# Patient Record
Sex: MALE | Race: WHITE | Employment: OTHER | URBAN - METROPOLITAN AREA
[De-identification: names, ages, dates, MRNs, and addresses within clinical notes are randomized per-mention and may not be internally consistent; named-entity substitution may affect disease eponyms.]

---

## 2019-04-30 ENCOUNTER — HOSPITAL ENCOUNTER (EMERGENCY)
Age: 69
Discharge: HOME | End: 2019-04-30
Attending: EMERGENCY MEDICINE
Payer: MEDICARE

## 2019-04-30 VITALS
TEMPERATURE: 98.3 F | HEART RATE: 82 BPM | DIASTOLIC BLOOD PRESSURE: 81 MMHG | RESPIRATION RATE: 18 BRPM | SYSTOLIC BLOOD PRESSURE: 153 MMHG | WEIGHT: 220 LBS | OXYGEN SATURATION: 96 %

## 2019-04-30 DIAGNOSIS — R31.9 HEMATURIA, UNSPECIFIED TYPE: ICD-10-CM

## 2019-04-30 DIAGNOSIS — R33.8 ACUTE URINARY RETENTION: Primary | ICD-10-CM

## 2019-04-30 LAB
ANION GAP SERPL CALCULATED.3IONS-SCNC: 12 MMOL/L (ref 3–16)
BILIRUBIN URINE: NEGATIVE
BLOOD, URINE: ABNORMAL
BUN BLDV-MCNC: 17 MG/DL (ref 7–20)
CALCIUM SERPL-MCNC: 9.4 MG/DL (ref 8.3–10.6)
CHLORIDE BLD-SCNC: 107 MMOL/L (ref 99–110)
CLARITY: CLEAR
CO2: 22 MMOL/L (ref 21–32)
COLOR: YELLOW
CREAT SERPL-MCNC: 0.9 MG/DL (ref 0.8–1.3)
EPITHELIAL CELLS, UA: ABNORMAL /HPF
GFR AFRICAN AMERICAN: >60
GFR NON-AFRICAN AMERICAN: >60
GLUCOSE BLD-MCNC: 120 MG/DL (ref 70–99)
GLUCOSE URINE: NEGATIVE MG/DL
HCT VFR BLD CALC: 43.3 % (ref 40.5–52.5)
HEMOGLOBIN: 14.6 G/DL (ref 13.5–17.5)
KETONES, URINE: NEGATIVE MG/DL
LEUKOCYTE ESTERASE, URINE: NEGATIVE
MCH RBC QN AUTO: 33.2 PG (ref 26–34)
MCHC RBC AUTO-ENTMCNC: 33.8 G/DL (ref 31–36)
MCV RBC AUTO: 98.1 FL (ref 80–100)
MICROSCOPIC EXAMINATION: YES
NITRITE, URINE: NEGATIVE
PDW BLD-RTO: 14.1 % (ref 12.4–15.4)
PH UA: 5.5 (ref 5–8)
PLATELET # BLD: 268 K/UL (ref 135–450)
PMV BLD AUTO: 6.9 FL (ref 5–10.5)
POTASSIUM SERPL-SCNC: 4.1 MMOL/L (ref 3.5–5.1)
PROTEIN UA: NEGATIVE MG/DL
RBC # BLD: 4.41 M/UL (ref 4.2–5.9)
RBC UA: ABNORMAL /HPF (ref 0–2)
SODIUM BLD-SCNC: 141 MMOL/L (ref 136–145)
SPECIFIC GRAVITY UA: <=1.005 (ref 1–1.03)
URINE REFLEX TO CULTURE: ABNORMAL
URINE TYPE: ABNORMAL
UROBILINOGEN, URINE: 0.2 E.U./DL
WBC # BLD: 10.1 K/UL (ref 4–11)
WBC UA: ABNORMAL /HPF (ref 0–5)

## 2019-04-30 PROCEDURE — 51702 INSERT TEMP BLADDER CATH: CPT

## 2019-04-30 PROCEDURE — 99284 EMERGENCY DEPT VISIT MOD MDM: CPT

## 2019-04-30 PROCEDURE — 80048 BASIC METABOLIC PNL TOTAL CA: CPT

## 2019-04-30 PROCEDURE — 51798 US URINE CAPACITY MEASURE: CPT

## 2019-04-30 PROCEDURE — 36415 COLL VENOUS BLD VENIPUNCTURE: CPT

## 2019-04-30 PROCEDURE — 81001 URINALYSIS AUTO W/SCOPE: CPT

## 2019-04-30 PROCEDURE — 85027 COMPLETE CBC AUTOMATED: CPT

## 2019-04-30 RX ORDER — TAMSULOSIN HYDROCHLORIDE 0.4 MG/1
0.4 CAPSULE ORAL DAILY
Status: ON HOLD | COMMUNITY
Start: 2018-07-16 | End: 2022-04-13 | Stop reason: HOSPADM

## 2019-04-30 SDOH — HEALTH STABILITY: MENTAL HEALTH: HOW OFTEN DO YOU HAVE A DRINK CONTAINING ALCOHOL?: NEVER

## 2019-04-30 ASSESSMENT — ENCOUNTER SYMPTOMS
DIARRHEA: 0
SHORTNESS OF BREATH: 0
NAUSEA: 1
COLOR CHANGE: 0
ABDOMINAL PAIN: 1
BACK PAIN: 1
VOMITING: 0

## 2019-04-30 ASSESSMENT — PAIN SCALES - GENERAL: PAINLEVEL_OUTOF10: 9

## 2019-04-30 NOTE — ED NOTES
Drained 1600 mL of urine from arreaga bag, then switched pt to leg bag. He acknowledged my care taking instructions.      Cinthia Heart  04/30/19 7564

## 2019-04-30 NOTE — ED PROVIDER NOTES
[facility]  ED  eMERGENCY dEPARTMENT eNCOUnter        Pt Name: Toni Islas  MRN: [id number]  Joycelyngfenrique 1950  Date of evaluation: 4/30/2019  Provider: Houston Manrique MD  PCP: Abbey Schneider MD      CHIEF COMPLAINT       Chief Complaint   Patient presents with    Urinary Retention     started in the middle of the night       HISTORY OFPRESENT ILLNESS   (Location/Symptom, Timing/Onset, Context/Setting, Quality, Duration, Modifying Factors,Severity)  Note limiting factors. Toni Islas is a 80 y.o. male presenting today due to concern for inability to urinate since the middle of the night with associated lower abdominal pain and nausea that radiated to his back. He had greater than 1 L of urine retention upon arrival to the emergency department by bladder scan and therefore a Marte catheter was placed. Currently, he states his abdominal discomfort and back pain is gone and he is no longer nauseated. No vomiting or fever. No chest pain or shortness of breath. He has a history of benign prostatic hyperplasia and follows up with  urology. He is supposed to have an MRI of his prostate in the near future but states he just got back from [place] and needed to set up the appointment with his urologist.  Denies any other concerns at this time and again he is already feeling better once the Marte catheter was placed. He has never needed a Marte in the past.  No midline back pain, falls, or trauma. REVIEW OF SYSTEMS    (2-9 systems for level 4, 10 or more for level 5)     Review of Systems   Constitutional: Negative for chills and fever. HENT: Negative for congestion. Respiratory: Negative for shortness of breath. Cardiovascular: Negative for chest pain. Gastrointestinal: Positive for abdominal pain (resolved) and nausea (resolved). Negative for diarrhea and vomiting.    Genitourinary: Positive for decreased urine volume, difficulty urinating and flank pain (resolved). Negative for dysuria and hematuria. Musculoskeletal: Positive for back pain (resolved). Negative for neck pain. Skin: Negative for color change. Neurological: Negative for weakness and numbness. Psychiatric/Behavioral: Negative for confusion. Positives and Pertinent negatives as per HPI. PASTMEDICAL HISTORY     Past Medical History:   Diagnosis Date    BPH (benign prostatic hyperplasia)          SURGICAL HISTORY       Past Surgical History:   Procedure Laterality Date    HERNIA REPAIR           CURRENT MEDICATIONS       Discharge Medication List as of 4/30/2019  8:37 AM      CONTINUE these medications which have NOT CHANGED    Details   tamsulosin (FLOMAX) 0.4 MG capsule Take 0.4 mg by mouthHistorical Med             ALLERGIES     Pcn [penicillins]    FAMILY HISTORY     History reviewed. No pertinent family history.        SOCIAL HISTORY       Social History     Socioeconomic History    Marital status:      Spouse name: None    Number of children: None    Years of education: None    Highest education level: None   Occupational History    None   Social Needs    Financial resource strain: None    Food insecurity:     Worry: None     Inability: None    Transportation needs:     Medical: None     Non-medical: None   Tobacco Use    Smoking status: Never Smoker    Smokeless tobacco: Never Used   Substance and Sexual Activity    Alcohol use: Never     Frequency: Never    Drug use: Yes     Types: Marijuana    Sexual activity: None   Lifestyle    Physical activity:     Days per week: None     Minutes per session: None    Stress: None   Relationships    Social connections:     Talks on phone: None     Gets together: None     Attends Christian service: None     Active member of club or organization: None     Attends meetings of clubs or organizations: None     Relationship status: None    Intimate partner violence:     Fear of current or ex partner: None Emotionally abused: None     Physically abused: None     Forced sexual activity: None   Other Topics Concern    None   Social History Narrative    None       SCREENINGS      @FLOW([id number])@      PHYSICAL EXAM    (up to 7 for level 4, 8 or more for level 5)     ED Triage Vitals [04/30/19 0723]   BP Temp Temp Source Pulse Resp SpO2 Height Weight   (!) 175/99 98.3 °F (36.8 °C) Oral 107 16 99 % -- 220 lb (99.8 kg)       Physical Exam   Constitutional: He is oriented to person, place, and time. He appears well-developed and well-nourished. He is active and cooperative. Non-toxic appearance. He does not have a sickly appearance. He does not appear ill. No distress. HENT:   Head: Normocephalic and atraumatic. Neck: Neck supple. Cardiovascular: Normal rate, regular rhythm and intact distal pulses. Pulmonary/Chest: Effort normal and breath sounds normal. No stridor. No respiratory distress. He has no wheezes. He has no rales. Abdominal: Soft. Bowel sounds are normal. He exhibits no distension. There is no tenderness. There is no rigidity, no rebound, no guarding, no CVA tenderness, no tenderness at McBurney's point and negative Groves's sign. Musculoskeletal: He exhibits no deformity. Thoracic back: He exhibits normal range of motion, no tenderness and no bony tenderness. Lumbar back: He exhibits normal range of motion, no tenderness and no bony tenderness. Neurological: He is alert and oriented to person, place, and time. Skin: Skin is warm and dry. He is not diaphoretic. Psychiatric: He has a normal mood and affect. Nursing note and vitals reviewed.           DIAGNOSTIC RESULTS   LABS:    Labs Reviewed   URINE RT REFLEX TO CULTURE - Abnormal; Notable for the following components:       Result Value    Blood, Urine LARGE (*)     All other components within normal limits    Narrative:     Performed at:  [facility]) - [address], [address] [place] [address] Phone [phone] - Abnormal; Notable for the following components:    Glucose 120 (*)     All other components within normal limits    Narrative:     Performed at:  [facility]) [address],  [place], [address] [facility]   Phone [phone]   MICROSCOPIC URINALYSIS - Abnormal; Notable for the following components:    RBC, UA 10-20 (*)     All other components within normal limits    Narrative:     Performed at:  [address],  [place], [address] [facility]   Phone [phone]   CBC    Narrative:     Performed at:  [address],  [place], [address] [facility]   Phone [phone]       All other labs were within normal range or not returned asof this dictation. EKG: All EKG's are interpreted by the Emergency Department Physician who either signs or Co-signs this chart in the absence of a cardiologist.        RADIOLOGY:   Non-plain film images such as CT, Ultrasound and MRI are read by the radiologist. Seth Shaikh images are visualized and preliminarily interpreted by the  ED Provider with the belowfindings:        Interpretation per the Radiologist below, if available at the time of this note:    No orders to display         PROCEDURES   Unless otherwise noted below, none     Procedures    CRITICAL CARE TIME   N/A    CONSULTS:  None    EMERGENCY DEPARTMENT COURSE and DIFFERENTIAL DIAGNOSIS/MDM:   Vitals:    Vitals:    04/30/19 0723 04/30/19 0817 04/30/19 0910   BP: (!) 175/99 136/73 (!) 153/81   Pulse: 107  82   Resp: 16  18   Temp: 98.3 °F (36.8 °C)     TempSrc: Oral     SpO2: 99% 93% 96%   Weight: 220 lb (99.8 kg)         Patient was given the following medications:  Medications - No data to display    Patient was evaluated due to concern for urinary retention with lower abdominal pain. Currently, he is asymptomatic once the Marte was placed by nursing staff.   No significant findings on lab work other than hematuria which could've been due to Marte and he is already planning to follow-up with urology related to this. He knows to return to the emergency department for difficulty with urinating with Marte in place, development of abdominal pain with vomiting or fever, other concerning symptoms, but otherwise follow up with urology in the next 2-3 days for further evaluation with bladder obstruction most likely from enlarged prostate. He was well-appearing and in no acute distress at time of discharge and felt comfortable with this plan. The patient tolerated their visit well. The patient and / or the family were informed of the results of any tests, a time was given to answer questions. FINAL IMPRESSION      1. Acute urinary retention    2.  Hematuria, unspecified type          DISPOSITION/PLAN   DISPOSITION  - discharged in improved, stable condition      PATIENT REFERRED TO:  [facility]  ED  [address]  Go to   If symptoms worsen    Beatriz Tong, [address]  [phone]      As needed    MD [facility] [address] [facility]  [phone]    Call today        Lorraine Sosa:  Discharge Medication List as of 4/30/2019  8:37 AM          DISCONTINUED MEDICATIONS:  Discharge Medication List as of 4/30/2019  8:37 AM                 (Please note that portions of this note were completed with a voicerecognition program.  Efforts were made to edit the dictations but occasionally words are mis-transcribed.)    Tadeo Petty MD (electronically signed)            Tadeo Petty MD  04/30/19 6256

## 2022-04-10 ENCOUNTER — APPOINTMENT (OUTPATIENT)
Dept: GENERAL RADIOLOGY | Age: 72
DRG: 246 | End: 2022-04-10
Payer: MEDICARE

## 2022-04-10 ENCOUNTER — HOSPITAL ENCOUNTER (INPATIENT)
Age: 72
LOS: 3 days | Discharge: HOME | DRG: 246 | End: 2022-04-13
Attending: EMERGENCY MEDICINE | Admitting: INTERNAL MEDICINE
Payer: MEDICARE

## 2022-04-10 DIAGNOSIS — I21.09 ACUTE ANTERIOR MYOCARDIAL INFARCTION (HCC): ICD-10-CM

## 2022-04-10 DIAGNOSIS — I48.91 ATRIAL FIBRILLATION, UNSPECIFIED TYPE (HCC): ICD-10-CM

## 2022-04-10 DIAGNOSIS — Z95.5 STENTED CORONARY ARTERY: ICD-10-CM

## 2022-04-10 DIAGNOSIS — I21.02 ACUTE ST ELEVATION MYOCARDIAL INFARCTION (STEMI) DUE TO OCCLUSION OF LEFT ANTERIOR DESCENDING (LAD) CORONARY ARTERY (HCC): ICD-10-CM

## 2022-04-10 DIAGNOSIS — I21.3 ST ELEVATION MYOCARDIAL INFARCTION (STEMI), UNSPECIFIED ARTERY (HCC): Primary | ICD-10-CM

## 2022-04-10 LAB
A/G RATIO: 1.6 (ref 1.1–2.2)
ALBUMIN SERPL-MCNC: 4.7 G/DL (ref 3.4–5)
ALP BLD-CCNC: 61 U/L (ref 40–129)
ALT SERPL-CCNC: 27 U/L (ref 10–40)
ANION GAP SERPL CALCULATED.3IONS-SCNC: 14 MMOL/L (ref 3–16)
APTT: 31.4 SEC (ref 26.2–38.6)
APTT: 32 SEC (ref 26.2–38.6)
APTT: 33.8 SEC (ref 26.2–38.6)
AST SERPL-CCNC: 68 U/L (ref 15–37)
BASOPHILS ABSOLUTE: 0 K/UL (ref 0–0.2)
BASOPHILS RELATIVE PERCENT: 0.7 %
BILIRUB SERPL-MCNC: 0.4 MG/DL (ref 0–1)
BUN BLDV-MCNC: 11 MG/DL (ref 7–20)
CALCIUM SERPL-MCNC: 9.4 MG/DL (ref 8.3–10.6)
CHLORIDE BLD-SCNC: 105 MMOL/L (ref 99–110)
CO2: 23 MMOL/L (ref 21–32)
CREAT SERPL-MCNC: 1 MG/DL (ref 0.8–1.3)
EKG ATRIAL RATE: 65 BPM
EKG DIAGNOSIS: NORMAL
EKG P AXIS: 31 DEGREES
EKG P-R INTERVAL: 190 MS
EKG Q-T INTERVAL: 406 MS
EKG QRS DURATION: 108 MS
EKG QTC CALCULATION (BAZETT): 422 MS
EKG R AXIS: -55 DEGREES
EKG T AXIS: 26 DEGREES
EKG VENTRICULAR RATE: 65 BPM
EOSINOPHILS ABSOLUTE: 0 K/UL (ref 0–0.6)
EOSINOPHILS RELATIVE PERCENT: 0.4 %
GFR AFRICAN AMERICAN: >60
GFR NON-AFRICAN AMERICAN: >60
GLUCOSE BLD-MCNC: 130 MG/DL (ref 70–99)
HCT VFR BLD CALC: 48.8 % (ref 40.5–52.5)
HEMOGLOBIN: 16.1 G/DL (ref 13.5–17.5)
INR BLD: 0.96 (ref 0.88–1.12)
LYMPHOCYTES ABSOLUTE: 0.8 K/UL (ref 1–5.1)
LYMPHOCYTES RELATIVE PERCENT: 11 %
MAGNESIUM: 2 MG/DL (ref 1.8–2.4)
MCH RBC QN AUTO: 33.4 PG (ref 26–34)
MCHC RBC AUTO-ENTMCNC: 33.1 G/DL (ref 31–36)
MCV RBC AUTO: 101 FL (ref 80–100)
MONOCYTES ABSOLUTE: 0.5 K/UL (ref 0–1.3)
MONOCYTES RELATIVE PERCENT: 6.3 %
NEUTROPHILS ABSOLUTE: 6.2 K/UL (ref 1.7–7.7)
NEUTROPHILS RELATIVE PERCENT: 81.6 %
PDW BLD-RTO: 14.2 % (ref 12.4–15.4)
PLATELET # BLD: 274 K/UL (ref 135–450)
PMV BLD AUTO: 7.1 FL (ref 5–10.5)
POTASSIUM SERPL-SCNC: 4.3 MMOL/L (ref 3.5–5.1)
PRO-BNP: 238 PG/ML (ref 0–124)
PROTHROMBIN TIME: 10.8 SEC (ref 9.9–12.7)
RBC # BLD: 4.83 M/UL (ref 4.2–5.9)
SODIUM BLD-SCNC: 142 MMOL/L (ref 136–145)
SPECIMEN STATUS: NORMAL
TOTAL PROTEIN: 7.7 G/DL (ref 6.4–8.2)
TROPONIN: 0.37 NG/ML
WBC # BLD: 7.6 K/UL (ref 4–11)

## 2022-04-10 PROCEDURE — 92941 PRQ TRLML REVSC TOT OCCL AMI: CPT | Performed by: INTERNAL MEDICINE

## 2022-04-10 PROCEDURE — 6360000002 HC RX W HCPCS

## 2022-04-10 PROCEDURE — 93454 CORONARY ARTERY ANGIO S&I: CPT | Performed by: INTERNAL MEDICINE

## 2022-04-10 PROCEDURE — B2111ZZ FLUOROSCOPY OF MULTIPLE CORONARY ARTERIES USING LOW OSMOLAR CONTRAST: ICD-10-PCS | Performed by: INTERNAL MEDICINE

## 2022-04-10 PROCEDURE — 2700000000 HC OXYGEN THERAPY PER DAY

## 2022-04-10 PROCEDURE — 93458 L HRT ARTERY/VENTRICLE ANGIO: CPT

## 2022-04-10 PROCEDURE — 2500000003 HC RX 250 WO HCPCS: Performed by: INTERNAL MEDICINE

## 2022-04-10 PROCEDURE — C1887 CATHETER, GUIDING: HCPCS

## 2022-04-10 PROCEDURE — 6360000004 HC RX CONTRAST MEDICATION

## 2022-04-10 PROCEDURE — 85025 COMPLETE CBC W/AUTO DIFF WBC: CPT

## 2022-04-10 PROCEDURE — 6370000000 HC RX 637 (ALT 250 FOR IP): Performed by: INTERNAL MEDICINE

## 2022-04-10 PROCEDURE — 027135Z DILATION OF CORONARY ARTERY, TWO ARTERIES WITH TWO DRUG-ELUTING INTRALUMINAL DEVICES, PERCUTANEOUS APPROACH: ICD-10-PCS | Performed by: INTERNAL MEDICINE

## 2022-04-10 PROCEDURE — C1769 GUIDE WIRE: HCPCS

## 2022-04-10 PROCEDURE — C1760 CLOSURE DEV, VASC: HCPCS

## 2022-04-10 PROCEDURE — 84484 ASSAY OF TROPONIN QUANT: CPT

## 2022-04-10 PROCEDURE — C1725 CATH, TRANSLUMIN NON-LASER: HCPCS

## 2022-04-10 PROCEDURE — 93010 ELECTROCARDIOGRAM REPORT: CPT | Performed by: INTERNAL MEDICINE

## 2022-04-10 PROCEDURE — 83880 ASSAY OF NATRIURETIC PEPTIDE: CPT

## 2022-04-10 PROCEDURE — 85610 PROTHROMBIN TIME: CPT

## 2022-04-10 PROCEDURE — 4A023N7 MEASUREMENT OF CARDIAC SAMPLING AND PRESSURE, LEFT HEART, PERCUTANEOUS APPROACH: ICD-10-PCS | Performed by: INTERNAL MEDICINE

## 2022-04-10 PROCEDURE — C9601 PERC DRUG-EL COR STENT BRAN: HCPCS

## 2022-04-10 PROCEDURE — 71045 X-RAY EXAM CHEST 1 VIEW: CPT

## 2022-04-10 PROCEDURE — C9606 PERC D-E COR REVASC W AMI S: HCPCS

## 2022-04-10 PROCEDURE — 83735 ASSAY OF MAGNESIUM: CPT

## 2022-04-10 PROCEDURE — 99284 EMERGENCY DEPT VISIT MOD MDM: CPT

## 2022-04-10 PROCEDURE — 2709999900 HC NON-CHARGEABLE SUPPLY

## 2022-04-10 PROCEDURE — C1874 STENT, COATED/COV W/DEL SYS: HCPCS

## 2022-04-10 PROCEDURE — 80061 LIPID PANEL: CPT

## 2022-04-10 PROCEDURE — 2580000003 HC RX 258: Performed by: INTERNAL MEDICINE

## 2022-04-10 PROCEDURE — 2000000000 HC ICU R&B

## 2022-04-10 PROCEDURE — 94761 N-INVAS EAR/PLS OXIMETRY MLT: CPT

## 2022-04-10 PROCEDURE — C1894 INTRO/SHEATH, NON-LASER: HCPCS

## 2022-04-10 PROCEDURE — 2720000010 HC SURG SUPPLY STERILE

## 2022-04-10 PROCEDURE — 85730 THROMBOPLASTIN TIME PARTIAL: CPT

## 2022-04-10 PROCEDURE — 36415 COLL VENOUS BLD VENIPUNCTURE: CPT

## 2022-04-10 PROCEDURE — 6370000000 HC RX 637 (ALT 250 FOR IP)

## 2022-04-10 PROCEDURE — 80053 COMPREHEN METABOLIC PANEL: CPT

## 2022-04-10 PROCEDURE — 6360000002 HC RX W HCPCS: Performed by: INTERNAL MEDICINE

## 2022-04-10 PROCEDURE — 99152 MOD SED SAME PHYS/QHP 5/>YRS: CPT | Performed by: INTERNAL MEDICINE

## 2022-04-10 PROCEDURE — 93005 ELECTROCARDIOGRAM TRACING: CPT | Performed by: EMERGENCY MEDICINE

## 2022-04-10 PROCEDURE — 2500000003 HC RX 250 WO HCPCS

## 2022-04-10 PROCEDURE — B2151ZZ FLUOROSCOPY OF LEFT HEART USING LOW OSMOLAR CONTRAST: ICD-10-PCS | Performed by: INTERNAL MEDICINE

## 2022-04-10 RX ORDER — HEPARIN SODIUM 1000 [USP'U]/ML
60 INJECTION, SOLUTION INTRAVENOUS; SUBCUTANEOUS PRN
Status: DISCONTINUED | OUTPATIENT
Start: 2022-04-10 | End: 2022-04-11

## 2022-04-10 RX ORDER — MORPHINE SULFATE 2 MG/ML
2 INJECTION, SOLUTION INTRAMUSCULAR; INTRAVENOUS
Status: ACTIVE | OUTPATIENT
Start: 2022-04-10 | End: 2022-04-10

## 2022-04-10 RX ORDER — METOPROLOL SUCCINATE 25 MG/1
25 TABLET, EXTENDED RELEASE ORAL DAILY
Status: DISCONTINUED | OUTPATIENT
Start: 2022-04-10 | End: 2022-04-12

## 2022-04-10 RX ORDER — HEPARIN SODIUM 1000 [USP'U]/ML
30 INJECTION, SOLUTION INTRAVENOUS; SUBCUTANEOUS PRN
Status: DISCONTINUED | OUTPATIENT
Start: 2022-04-10 | End: 2022-04-11

## 2022-04-10 RX ORDER — EPTIFIBATIDE 0.75 MG/ML
2 INJECTION, SOLUTION INTRAVENOUS CONTINUOUS
Status: ACTIVE | OUTPATIENT
Start: 2022-04-10 | End: 2022-04-10

## 2022-04-10 RX ORDER — HEPARIN SODIUM 10000 [USP'U]/100ML
5-30 INJECTION, SOLUTION INTRAVENOUS CONTINUOUS
Status: DISCONTINUED | OUTPATIENT
Start: 2022-04-10 | End: 2022-04-10

## 2022-04-10 RX ORDER — ATROPINE SULFATE 0.4 MG/ML
0.5 AMPUL (ML) INJECTION
Status: ACTIVE | OUTPATIENT
Start: 2022-04-10 | End: 2022-04-10

## 2022-04-10 RX ORDER — FENTANYL CITRATE 50 UG/ML
INJECTION, SOLUTION INTRAMUSCULAR; INTRAVENOUS
Status: COMPLETED | OUTPATIENT
Start: 2022-04-10 | End: 2022-04-10

## 2022-04-10 RX ORDER — SODIUM CHLORIDE 9 MG/ML
25 INJECTION, SOLUTION INTRAVENOUS PRN
Status: DISCONTINUED | OUTPATIENT
Start: 2022-04-10 | End: 2022-04-13 | Stop reason: HOSPADM

## 2022-04-10 RX ORDER — HEPARIN SODIUM 1000 [USP'U]/ML
INJECTION, SOLUTION INTRAVENOUS; SUBCUTANEOUS
Status: COMPLETED | OUTPATIENT
Start: 2022-04-10 | End: 2022-04-10

## 2022-04-10 RX ORDER — SODIUM CHLORIDE 0.9 % (FLUSH) 0.9 %
5-40 SYRINGE (ML) INJECTION EVERY 12 HOURS SCHEDULED
Status: DISCONTINUED | OUTPATIENT
Start: 2022-04-10 | End: 2022-04-13 | Stop reason: HOSPADM

## 2022-04-10 RX ORDER — HEPARIN SODIUM 1000 [USP'U]/ML
60 INJECTION, SOLUTION INTRAVENOUS; SUBCUTANEOUS ONCE
Status: DISCONTINUED | OUTPATIENT
Start: 2022-04-10 | End: 2022-04-10

## 2022-04-10 RX ORDER — ONDANSETRON 2 MG/ML
4 INJECTION INTRAMUSCULAR; INTRAVENOUS EVERY 6 HOURS PRN
Status: DISCONTINUED | OUTPATIENT
Start: 2022-04-10 | End: 2022-04-13 | Stop reason: HOSPADM

## 2022-04-10 RX ORDER — MIDAZOLAM HYDROCHLORIDE 5 MG/ML
INJECTION INTRAMUSCULAR; INTRAVENOUS
Status: COMPLETED | OUTPATIENT
Start: 2022-04-10 | End: 2022-04-10

## 2022-04-10 RX ORDER — SODIUM CHLORIDE 9 MG/ML
INJECTION, SOLUTION INTRAVENOUS CONTINUOUS
Status: ACTIVE | OUTPATIENT
Start: 2022-04-10 | End: 2022-04-10

## 2022-04-10 RX ORDER — EPTIFIBATIDE 0.75 MG/ML
2 INJECTION, SOLUTION INTRAVENOUS CONTINUOUS
Status: DISCONTINUED | OUTPATIENT
Start: 2022-04-10 | End: 2022-04-10 | Stop reason: SDUPTHER

## 2022-04-10 RX ORDER — ALPRAZOLAM 0.25 MG/1
0.25 TABLET ORAL 2 TIMES DAILY PRN
Status: DISCONTINUED | OUTPATIENT
Start: 2022-04-10 | End: 2022-04-13 | Stop reason: HOSPADM

## 2022-04-10 RX ORDER — MORPHINE SULFATE 2 MG/ML
2 INJECTION, SOLUTION INTRAMUSCULAR; INTRAVENOUS
Status: DISCONTINUED | OUTPATIENT
Start: 2022-04-10 | End: 2022-04-13 | Stop reason: HOSPADM

## 2022-04-10 RX ORDER — NITROGLYCERIN 20 MG/100ML
30 INJECTION INTRAVENOUS CONTINUOUS
Status: DISCONTINUED | OUTPATIENT
Start: 2022-04-10 | End: 2022-04-11

## 2022-04-10 RX ORDER — NITROGLYCERIN 20 MG/100ML
40 INJECTION INTRAVENOUS CONTINUOUS
Status: DISCONTINUED | OUTPATIENT
Start: 2022-04-10 | End: 2022-04-10

## 2022-04-10 RX ORDER — ACETAMINOPHEN 325 MG/1
650 TABLET ORAL EVERY 4 HOURS PRN
Status: DISCONTINUED | OUTPATIENT
Start: 2022-04-10 | End: 2022-04-13 | Stop reason: HOSPADM

## 2022-04-10 RX ORDER — ATORVASTATIN CALCIUM 80 MG/1
80 TABLET, FILM COATED ORAL NIGHTLY
Status: DISCONTINUED | OUTPATIENT
Start: 2022-04-10 | End: 2022-04-13 | Stop reason: HOSPADM

## 2022-04-10 RX ORDER — SODIUM CHLORIDE 0.9 % (FLUSH) 0.9 %
5-40 SYRINGE (ML) INJECTION PRN
Status: DISCONTINUED | OUTPATIENT
Start: 2022-04-10 | End: 2022-04-13 | Stop reason: HOSPADM

## 2022-04-10 RX ADMIN — MIDAZOLAM HYDROCHLORIDE 1 MG: 5 INJECTION INTRAMUSCULAR; INTRAVENOUS at 13:00

## 2022-04-10 RX ADMIN — MIDAZOLAM HYDROCHLORIDE 1 MG: 5 INJECTION INTRAMUSCULAR; INTRAVENOUS at 13:09

## 2022-04-10 RX ADMIN — MIDAZOLAM HYDROCHLORIDE 2 MG: 5 INJECTION INTRAMUSCULAR; INTRAVENOUS at 12:20

## 2022-04-10 RX ADMIN — ACETAMINOPHEN 650 MG: 325 TABLET ORAL at 20:12

## 2022-04-10 RX ADMIN — HEPARIN SODIUM 1000 UNITS: 1000 INJECTION, SOLUTION INTRAVENOUS; SUBCUTANEOUS at 13:07

## 2022-04-10 RX ADMIN — HEPARIN SODIUM 7000 UNITS: 1000 INJECTION, SOLUTION INTRAVENOUS; SUBCUTANEOUS at 12:30

## 2022-04-10 RX ADMIN — FENTANYL CITRATE 25 MCG: 50 INJECTION, SOLUTION INTRAMUSCULAR; INTRAVENOUS at 12:37

## 2022-04-10 RX ADMIN — SODIUM CHLORIDE, PRESERVATIVE FREE 10 ML: 5 INJECTION INTRAVENOUS at 19:34

## 2022-04-10 RX ADMIN — NITROGLYCERIN 30 MCG/MIN: 20 INJECTION INTRAVENOUS at 18:36

## 2022-04-10 RX ADMIN — FENTANYL CITRATE 25 MCG: 50 INJECTION, SOLUTION INTRAMUSCULAR; INTRAVENOUS at 13:00

## 2022-04-10 RX ADMIN — EPTIFIBATIDE 2 MCG/KG/MIN: 0.75 INJECTION INTRAVENOUS at 14:24

## 2022-04-10 RX ADMIN — TICAGRELOR 90 MG: 90 TABLET ORAL at 20:13

## 2022-04-10 RX ADMIN — FENTANYL CITRATE 25 MCG: 50 INJECTION, SOLUTION INTRAMUSCULAR; INTRAVENOUS at 12:20

## 2022-04-10 RX ADMIN — SODIUM CHLORIDE: 9 INJECTION, SOLUTION INTRAVENOUS at 15:35

## 2022-04-10 RX ADMIN — MIDAZOLAM HYDROCHLORIDE 1 MG: 5 INJECTION INTRAMUSCULAR; INTRAVENOUS at 12:37

## 2022-04-10 RX ADMIN — ATORVASTATIN CALCIUM 80 MG: 80 TABLET, FILM COATED ORAL at 20:12

## 2022-04-10 RX ADMIN — EPTIFIBATIDE 2 MCG/KG/MIN: 0.75 INJECTION, SOLUTION INTRAVENOUS at 12:33

## 2022-04-10 RX ADMIN — NITROGLYCERIN 40 MCG/MIN: 20 INJECTION INTRAVENOUS at 14:26

## 2022-04-10 ASSESSMENT — PAIN DESCRIPTION - PAIN TYPE: TYPE: ACUTE PAIN

## 2022-04-10 ASSESSMENT — PAIN SCALES - GENERAL
PAINLEVEL_OUTOF10: 5
PAINLEVEL_OUTOF10: 5

## 2022-04-10 ASSESSMENT — PAIN DESCRIPTION - LOCATION: LOCATION: CHEST

## 2022-04-10 NOTE — ED NOTES
[address] transmission of EKG at this time  1123 - activated incoming pt as a Code STEMI  1130 - Dr Paul Balbuena called back to speak with Dr Ju Farooq  [phone] - [place] RN Rox Mitchell notified to call in cath lab  [phone] - EMS call with ETA of 30 mins from [facility] ED  200 - Dr Paul Balbuena called to notify Dr Ju Farooq that pt will be cathed by another cardiologist  [phone] - Dr Milady Holcomb called to speak with Dr Ju Farooq, Dr Milady Holcomb to cath pt  [phone] - Pt arrival into ED  [address] [facility] ED initial EKG captured, cath lab at the bedside as well  1203 - pt to cath lab via [address] [place] [address]  04/10/22 1208

## 2022-04-10 NOTE — H&P
Hospital Medicine History & Physical      PCP: Yash Weber MD    Date of Admission: 4/10/2022    Date of Service: Pt seen/examined on 04/10/22   and Admitted to Inpatient with expected LOS greater than two midnights due to medical therapy. Chief Complaint   Patient presents with    Chest Pain     Started 1030 this morning. Stemi per EMS. Asa 324 mg per EMS, Fentanyl 100 mcg iv, Zofran 4 mg per EMS     History Of Present Illness:    Hunter Ball is a 80 y.o. Male who presented to the [facility] ED  with complaints of severe chest pain. EMS triage showed anterior STEMI and cath lab was activated at [facility]. .  The patient has chest pain that started this am at about 1030 but had a few warning pains over the last few days . No nausea nor SOB nor presyncope. No bleeding or anticipated surgery in next year. Reports being very active normally. Lives with his wife and reports smoking in the past but currently smokes some \"weed every other day\". Denies alcohol or other illicit drug use . His Chest Pain was described as sharp, central in location associated with Tingling in his Arms . He related  his Arm pain to Arthritis initially . ED course : Patient initial EKG showed Anterior wall STEMI and initial Troponin was 0.37 . He was taken to Cath lab and reportedly had PCI with HOLDEN X2 to LAD . Patient admitted to ICU post Cath for further Care and Hospitalist consulted for admission     Upon evaluation by me in ICU , Patient laying flat in bed , had Right Groin approach . Reports improvement in chest pain compared to PTA . Offers no new complaints . The patient was informed of the results of tests, a time was given to answer questions, a plan was proposed and he agreed with plan. Medical reconciliation performed.        Past Medical History:      Diagnosis Date    BPH (benign prostatic hyperplasia)        Past Surgical History:        Procedure Laterality Date    HERNIA REPAIR         Medications Prior to Admission:    Prior to Admission medications    Medication Sig Start Date End Date Taking? Authorizing Provider   tamsulosin (FLOMAX) 0.4 MG capsule Take 0.4 mg by mouth 7/16/18   Historical Provider, MD       Allergies:  Pcn [penicillins]    Social History:    The patient currently lives at home with his wife and is independent of IADLs    TOBACCO:   reports that he has never smoked. He has never used smokeless tobacco.  ETOH:   reports no history of alcohol use. Family History:     Reviewed in detail and negative for DM, CAD, Cancer, CVA. Positive as follows:    No family history on file. REVIEW OF SYSTEMS:   Pertinent positives as noted in the HPI. All other systems reviewed and negative. PHYSICAL EXAM PERFORMED:  /82   Pulse 57   Temp 98.8 °F (37.1 °C) (Oral)   Resp 15   Ht 6' 1\" (1.854 m)   Wt 220 lb (99.8 kg)   SpO2 (!) 89%   BMI 29.03 kg/m²     General appearance:  No apparent distress, appears stated age and cooperative. HEENT:  Normal cephalic, atraumatic without obvious deformity. Pupils equal, round, and reactive to light. Extra ocular muscles intact. Conjunctivae/corneas clear. Neck: Supple, with full range of motion. No jugular venous distention. Trachea midline. Respiratory:  Normal respiratory effort. Clear to auscultation, bilaterally without Rales/Wheezes/Rhonchi. Cardiovascular:  Regular rate and rhythm with normal S1/S2 without murmurs, rubs or gallops. Abdomen: Soft, non-tender, non-distended with normal bowel sounds. Musculoskeletal:  No clubbing, cyanosis or edema bilaterally. Full range of motion without deformity. Skin: Skin color, texture, turgor normal.  No rashes or lesions. Neurologic:  Neurovascularly intact without any focal sensory/motor deficits.  Cranial nerves: II-XII intact, grossly non-focal.  Psychiatric:  Alert and oriented, thought content appropriate, normal insight  Capillary Refill: Brisk,< 3 seconds   Peripheral Pulses: +2 palpable, equal bilaterally       Labs:   Recent Labs     04/10/22  1200   WBC 7.6   HGB 16.1   HCT 48.8        Recent Labs     04/10/22  1200      K 4.3      CO2 23   BUN 11   CREATININE 1.0   CALCIUM 9.4     Recent Labs     04/10/22  1200   AST 68*   ALT 27   BILITOT 0.4   ALKPHOS 61     Recent Labs     04/10/22  1200   INR 0.96     Recent Labs     04/10/22  1200   TROPONINI 0.37*       EKG:  I have reviewed the EKG with the following interpretation: Anterior wall STEMI     Radiology:    I have reviewed the Imaging  with the following interpretation:   XR CHEST PORTABLE   Final Result   Small left basilar pneumonia               Active Hospital Problems    Diagnosis Date Noted    STEMI (ST elevation myocardial infarction) ([facility] [address] [I21.3] 04/10/2022    Acute anterior myocardial infarction [facility]) [I21.09] 04/10/2022    Acute ST elevation myocardial infarction (STEMI) due to occlusion of left anterior descending (LAD) coronary artery (HCC) [I21.02]      ASSESSMENT/PLAN:  1. Chest Pain due to CAD /Anterior wall STEMI POA   - S/p Emergent LHC and PCI with HOLDEN x2 to LAD on 4/10/22 by Dr. Althea Andrade  - Admitted to ICU and follow Post Cath protocol per cardiology   - Continue Brilinta , Integrilin x 12 hrs      2. Hx of BPH S/p prostatectomy - Stable ; Patient reports that he doesn't  Take Flomax anymore . DVT Prophylaxis: Integrilin gtt   Diet: ADULT DIET; Regular; Low Fat/Low Chol/High Fiber/ISMAEL; No Added Salt (3-4 gm)  Code Status: Full Code    PT/OT Eval Status: Not yet ordered     Dispo - Anticipate > 2 MN stay in the hospital      Wilfrido Pantoja MD    The note was completed using Dragon -speech recognition software & EMR  . Every effort was made to ensure accuracy; however, inadvertent computerized transcription errors may be present. Thank you Wiliam Washington MD for the opportunity to be involved in this patient's care.  If you have any questions or concerns please feel free to contact me at [phone].

## 2022-04-10 NOTE — H&P
[address]  CARDIOLOGY ADMISSION NOTE        Reason for Consultation/Chief Complaint: \"stemi. \"       History of Present Illness:  Kendra Bumpers is a 80 y.o. patient who presented to the hospital with complaints of severe chest pain. EMS triage showed anterior STEMI and cath lab was activated at [facility]. .  The patient has chest pain that started this am at about 1030 but had a few warning pains over the last few days. No nausea nor SOB nor presyncope. No bleeding or anticipated surgery in next year. Reports being very active normally. Past Medical History:   has a past medical history of BPH (benign prostatic hyperplasia). Surgical History:   has a past surgical history that includes hernia repair. Social History:   reports that he has never smoked. He has never used smokeless tobacco. He reports current drug use. Drug: Marijuana Armjen Martínez). He reports that he does not drink alcohol. Family History:  No evidence for sudden cardiac death or premature CAD    Home Medications:  Were reviewed and are listed in nursing record. and/or listed below  Prior to Admission medications    Medication Sig Start Date End Date Taking? Authorizing Provider   tamsulosin (FLOMAX) 0.4 MG capsule Take 0.4 mg by mouth 7/16/18   Historical Provider, MD        [facility] Medications:   ticagrelor  180 mg Oral Once    heparin (porcine)  60 Units/kg IntraVENous Once     heparin (porcine), heparin (porcine)   heparin (PORCINE) Infusion      eptifibatide 2 mcg/kg/min (04/10/22 1233)       Allergies:  Pcn [penicillins]     Review of Systems:     A 14 ROS obtained and negative except as mentioned in HPI. · Constitutional: there has been no unanticipated weight loss. · Eyes: No visual changes or diplopia. No scleral icterus. · ENT: No Headaches, hearing loss or vertigo. No mouth sores or sore throat. · Cardiovascular: No loss of consciousness. No hemoptysis, pleuritic pain, or phlebitis.   · Respiratory: No cough or wheezing, no sputum production. No hematemesis. · Gastrointestinal: No abdominal pain, appetite loss, blood in stools. No change in bowel or bladder habits. · Genitourinary: No dysuria, or hematuria. · Musculoskeletal:  No gait disturbance, weakness or joint complaints. · Integumentary: No rash or pruritis. · Neurological: No headache, diplopia,numbness or tingling. No change in gait, balance, coordination, mood, affect, memory, mentation, behavior. · Psychiatric: No anxiety,  · Endocrine: No malaise,  · Hematologic/Lymphatic: No abnormal bruising  · Allergic/Immunologic: No nasal congestion      Physical Examination:    Vitals:    04/10/22 1158   BP: (!) 157/96   Pulse: 66   Resp: 16   Temp: 98.8 °F (37.1 °C)   SpO2: 100%    Weight: 220 lb (99.8 kg)         General Appearance:  Alert, cooperative, no distress, appears stated age   Head:  Normocephalic, without obvious abnormality, atraumatic   Eyes:  PERRL   Nose: Nares normal,   Neck: Supple, JVP normal    Lungs:   Clear to auscultation bilaterally, respirations unlabored   Chest Wall:  No tenderness or deformity   Heart:  Regular rate and rhythm, normal S1, S2 normal, no murmur, I/VI HSM  No rub. No S3 gallop   Abdomen:   Soft, non-tender, +bowel sounds   Extremities: no cyanosis, no clubbing , No  d* edema   Pulses: Symmetric extremities   Skin: no gross lesions or rashes   Pysch: Normal mood and affect   Neurologic: No gross deficits.   CN II - XII grossly intact        Labs  CBC:   Lab Results   Component Value Date    WBC 7.6 04/10/2022    RBC 4.83 04/10/2022    HGB 16.1 04/10/2022    HCT 48.8 04/10/2022    .0 04/10/2022    RDW 14.2 04/10/2022     04/10/2022     CMP:    Lab Results   Component Value Date     04/10/2022    K 4.3 04/10/2022     04/10/2022    CO2 23 04/10/2022    BUN 11 04/10/2022    CREATININE 1.0 04/10/2022    GFRAA >60 04/10/2022    AGRATIO 1.6 04/10/2022    LABGLOM >60 04/10/2022    GLUCOSE 130 04/10/2022 PROT 7.7 04/10/2022    CALCIUM 9.4 04/10/2022    BILITOT 0.4 04/10/2022    ALKPHOS 61 04/10/2022    AST 68 04/10/2022    ALT 27 04/10/2022     PT/INR:  No results found for: PTINR  Recent Labs     04/10/22  1200   TROPONINI 0.37*       EKG: SR  Anterior STEMI      Impression:  Acute anterior STEMI    Recommendations:    I had the opportunity to review the clinical symptoms and presentation of Kendra Bumpers. I recommend that the patient undergo further cardiac evaluation with  cardiac catheterization with likely LAD Coronary intervention *. Needs core measure meds  The patient will require ICU managememnt and is critically ill. Thank you for allowing to us to participate in the care or Kendra Bumpers. The note was completed using EMR. Every effort was made to ensure accuracy; however, inadvertent computerized transcription errors may be present.        Shaunna Richmond MD [facility]

## 2022-04-10 NOTE — PROGRESS NOTES
Spoke with cath lab and Dr. Marlon Sandra regarding pts Nitro gtt. Turn gtt down to 30 mcg/min. Stop gtt if systolic is less than 576. OK to hold metoprolol due to HR 57. Hold heparin gtt.

## 2022-04-10 NOTE — PROGRESS NOTES
Spoke with Dr. Gricelda Falk. Decrease pts Nitro gtt to 30 mcg/min, turn off infusion if sys goes below 100. Keep patient flat for 4 hours.

## 2022-04-10 NOTE — ED PROVIDER NOTES
CHIEF COMPLAINT  Chest Pain (Started 1030 this morning. Stemi per EMS. Asa 324 mg per EMS, Fentanyl 100 mcg iv, Zofran 4 mg per EMS)      HISTORY OF PRESENT ILLNESS  Frank Organ is a 80 y.o. male with a history of no chronic medical conditions who presents to the ED complaining of chest pain. Patient reports onset of chest pain today. States he was completely normal yesterday. EMS EKG concerning with anterolateral ST elevations. Cath Lab was activated prior to patient arrival.  He received aspirin and fentanyl during transport. Pain is left-sided, moderate in intensity. Denies dyspnea, syncope, palpitations, emesis, abdominal pain, fever, chills. Denies any prior cardiac history. No other complaints, modifying factors or associated symptoms. I have reviewed the following from the nursing documentation. Past Medical History:   Diagnosis Date    BPH (benign prostatic hyperplasia)      Past Surgical History:   Procedure Laterality Date    HERNIA REPAIR       No family history on file. Social History     Socioeconomic History    Marital status:      Spouse name: Not on file    Number of children: Not on file    Years of education: Not on file    Highest education level: Not on file   Occupational History    Not on file   Tobacco Use    Smoking status: Never Smoker    Smokeless tobacco: Never Used   Substance and Sexual Activity    Alcohol use: Never    Drug use: Yes     Types: Marijuana Garon Kettle)    Sexual activity: Not on file   Other Topics Concern    Not on file   Social History Narrative    Not on file     Social Determinants of Health     Financial Resource Strain:     Difficulty of Paying Living Expenses: Not on file   Food Insecurity:     Worried About Running Out of Food in the Last Year: Not on file    Lauren of Food in the Last Year: Not on file   Transportation Needs:     Lack of Transportation (Medical): Not on file    Lack of Transportation (Non-Medical):  Not on file   Physical Activity:     Days of Exercise per Week: Not on file    Minutes of Exercise per Session: Not on file   Stress:     Feeling of Stress : Not on file   Social Connections:     Frequency of Communication with Friends and Family: Not on file    Frequency of Social Gatherings with Friends and Family: Not on file    Attends Nondenominational Services: Not on file    Active Member of Clubs or Organizations: Not on file    Attends Club or Organization Meetings: Not on file    Marital Status: Not on file   Intimate Partner Violence:     Fear of Current or Ex-Partner: Not on file    Emotionally Abused: Not on file    Physically Abused: Not on file    Sexually Abused: Not on file   Housing Stability:     Unable to Pay for Housing in the Last Year: Not on file    Number of [place] in the Last Year: Not on file    Unstable Housing in the Last Year: Not on file     Current Facility-Administered Medications   Medication Dose Route Frequency Provider Last Rate Last Admin    ticagrelor (BRILINTA) tablet 180 mg  180 mg Oral Once Jessi Renteria MD        heparin (porcine) injection 60 Units/kg  60 Units/kg IntraVENous Once Jessi Renteria MD        heparin (porcine) injection 60 Units/kg  60 Units/kg IntraVENous PRN Jessi Renteria MD        heparin (porcine) injection 30 Units/kg  30 Units/kg IntraVENous PRN Jessi Renteria MD        heparin 25,000 unit in sodium chloride 0.45% 250 mL (premix) infusion  5-30 Units/kg/hr IntraVENous Continuous Jessi Renteria MD   Held at 04/10/22 1429    0.9 % sodium chloride infusion   IntraVENous Continuous Natasha Alfredo MD        sodium chloride flush 0.9 % injection 5-40 mL  5-40 mL IntraVENous 2 times per day Natasha Alfredo MD        sodium chloride flush 0.9 % injection 5-40 mL  5-40 mL IntraVENous PRN Natasha Alfredo MD        0.9 % sodium chloride infusion  25 mL IntraVENous PRN Natasha Alfredo MD        acetaminophen (TYLENOL) tablet 650 mg  650 mg Oral Q4H PRN Tylene Samples, MD        atropine injection 0.5 mg  0.5 mg IntraVENous Once PRN Tylene Samples, MD        morphine (PF) injection 2 mg  2 mg IntraVENous Once PRN Tylene Samples, MD        ondansetron [facility]) injection 4 mg  4 mg IntraVENous Q6H PRN Tylene Samples, MD        atorvastatin (LIPITOR) tablet 80 mg  80 mg Oral Nightly Tylene Samples, MD        metoprolol succinate (TOPROL XL) extended release tablet 25 mg  25 mg Oral Daily Tylene Samples, MD        ticagrelor [facility]) tablet 90 mg  90 mg Oral BID Tylene Samples, MD        nitroGLYCERIN 50 mg in dextrose 5% 250 mL infusion  40 mcg/min IntraVENous Continuous Tylene Samples, MD 9 mL/hr at 04/10/22 1436 30 mcg/min at 04/10/22 1436    eptifibatide (INTEGRILIN) 0.75 mg/mL infusion  2 mcg/kg/min IntraVENous Continuous Tylene Samples, MD 16 mL/hr at 04/10/22 1424 2 mcg/kg/min at 04/10/22 1424     Allergies   Allergen Reactions    Pcn [Penicillins]        REVIEW OF SYSTEMS  10 systems reviewed, pertinent positives per HPI otherwise noted to be negative. PHYSICAL EXAM  BP (!) 157/96   Pulse 57   Temp 98.8 °F (37.1 °C) (Oral)   Resp 16   Ht 6' 1\" (1.854 m)   Wt 220 lb (99.8 kg)   SpO2 100%   BMI 29.03 kg/m²   GENERAL APPEARANCE: Awake and alert. Cooperative. No acute distress. HEAD: Normocephalic. Atraumatic. EYES: PERRL. EOM's grossly intact. ENT: Mucous membranes are moist.   NECK: Supple, trachea midline. HEART: RRR. Normal S1, S2. No murmurs, rubs or gallops. LUNGS: Respirations unlabored. CTAB. Good air exchange. No wheezes, rales, or rhonchi. Speaking comfortably in full sentences. ABDOMEN: Soft. Non-distended. Non-tender. No guarding or rebound. Normal Bowel sounds. EXTREMITIES: No peripheral edema. MAEE. No acute deformities. SKIN: Warm and dry. No acute rashes. NEUROLOGICAL: Alert and oriented X 3. CN II-XII intact. No gross facial drooping.   Strength symmetrical  PSYCHIATRIC: Normal mood and affect. LABS  I have reviewed all labs for this visit.    Results for orders placed or performed during the hospital encounter of 04/10/22   CBC with Auto Differential   Result Value Ref Range    WBC 7.6 4.0 - 11.0 K/uL    RBC 4.83 4.20 - 5.90 M/uL    Hemoglobin 16.1 13.5 - 17.5 g/dL    Hematocrit 48.8 40.5 - 52.5 %    .0 (H) 80.0 - 100.0 fL    MCH 33.4 26.0 - 34.0 pg    MCHC 33.1 31.0 - 36.0 g/dL    RDW 14.2 12.4 - 15.4 %    Platelets 146 170 - 833 K/uL    MPV 7.1 5.0 - 10.5 fL    Neutrophils % 81.6 %    Lymphocytes % 11.0 %    Monocytes % 6.3 %    Eosinophils % 0.4 %    Basophils % 0.7 %    Neutrophils Absolute 6.2 1.7 - 7.7 K/uL    Lymphocytes Absolute 0.8 (L) 1.0 - 5.1 K/uL    Monocytes Absolute 0.5 0.0 - 1.3 K/uL    Eosinophils Absolute 0.0 0.0 - 0.6 K/uL    Basophils Absolute 0.0 0.0 - 0.2 K/uL   Comprehensive Metabolic Panel   Result Value Ref Range    Sodium 142 136 - 145 mmol/L    Potassium 4.3 3.5 - 5.1 mmol/L    Chloride 105 99 - 110 mmol/L    CO2 23 21 - 32 mmol/L    Anion Gap 14 3 - 16    Glucose 130 (H) 70 - 99 mg/dL    BUN 11 7 - 20 mg/dL    CREATININE 1.0 0.8 - 1.3 mg/dL    GFR Non-African American >60 >60    GFR African American >60 >60    Calcium 9.4 8.3 - 10.6 mg/dL    Total Protein 7.7 6.4 - 8.2 g/dL    Albumin 4.7 3.4 - 5.0 g/dL    Albumin/Globulin Ratio 1.6 1.1 - 2.2    Total Bilirubin 0.4 0.0 - 1.0 mg/dL    Alkaline Phosphatase 61 40 - 129 U/L    ALT 27 10 - 40 U/L    AST 68 (H) 15 - 37 U/L   Magnesium   Result Value Ref Range    Magnesium 2.00 1.80 - 2.40 mg/dL   Troponin   Result Value Ref Range    Troponin 0.37 (H) <0.01 ng/mL   Brain Natriuretic Peptide   Result Value Ref Range    Pro- (H) 0 - 124 pg/mL   Protime-INR   Result Value Ref Range    Protime 10.8 9.9 - 12.7 sec    INR 0.96 0.88 - 1.12   APTT   Result Value Ref Range    aPTT 32.0 26.2 - 38.6 sec   Sample possible blood bank testing   Result Value Ref Range    Specimen Status DAVEY    EKG 12 Lead   Result Value Ref Range    Ventricular Rate 65 BPM    Atrial Rate 65 BPM    P-R Interval 190 ms    QRS Duration 108 ms    Q-T Interval 406 ms    QTc Calculation (Bazett) 422 ms    P Axis 31 degrees    R Axis -55 degrees    T Axis 26 degrees    Diagnosis       Sinus rhythm with Fusion complexesLeft anterior fascicular blockMinimal voltage criteria for LVH, may be normal variantAnteroseptal infarct , possibly acuteLateral injury pattern** ** ** ** * ACUTE MI  ** ** ** **Abnormal ECGWhen compared with ECG of 22-MAR-2021 22:11,Significant changes have occurredConfirmed by Antonina Mackey MD, [facility] ([id number]) on 4/10/2022 1:18:22 PM       EKG  NSR, rate 65, leftward axis, QTC within normal limits, significant anteroseptal and lateral ST elevations with inferior depressions concerning for STEMI. RADIOLOGY  X-RAYS:  I have reviewed radiologic plain film image(s). ALL OTHER NON-PLAIN FILM IMAGES SUCH AS CT, ULTRASOUND AND MRI HAVE BEEN READ BY THE RADIOLOGIST. XR CHEST PORTABLE   Final Result   Small left basilar pneumonia                    Rechecks: Physical assessment performed. Patient sent promptly to the Cath Lab    ED COURSE/MDM  Patient seen and evaluated. Old records reviewed. Labs and imaging reviewed and results discussed with patient. Cath Lab activated prior to patient arrival.  Repeat EKG, vital signs, labs obtained and then patient was sent for PCI. Current Discharge Medication List          CLINICAL IMPRESSION  1. ST elevation myocardial infarction (STEMI), unspecified artery (HCC)        Blood pressure (!) 157/96, pulse 57, temperature 98.8 °F (37.1 °C), temperature source Oral, resp. rate 16, height 6' 1\" (1.854 m), weight 220 lb (99.8 kg), SpO2 100 %. Amy Mendieta was sent directly to the Cath Lab in stable condition.         Jessi Renteria MD  04/10/22 [id number]

## 2022-04-10 NOTE — PROCEDURES
[address] [address]                            CARDIAC CATHETERIZATION    PATIENT NAME: Mirella Robin                    :        1950  MED REC NO:   [id number]                          ROOM:       [id number]  ACCOUNT NO:   [de-identified]                           ADMIT DATE: 04/10/2022  PROVIDER:     Melanie Ibanez. Radha Bobo MD    DATE OF PROCEDURE:  04/10/2022    LOCATION:  [facility]    PROCEDURES:  Cardiac catheterization, percutaneous coronary  intervention. INDICATIONS FOR THE PROCEDURE:  The patient presents with chest pain  that started about 10:30 a.m., EMS triaged the patient and found on EKG  that he had anterior ST elevation MI. Started having chest pain about  1030 today. He was whisked pretty quickly to the cardiac  catheterization laboratory at [facility], where I met him  in the cath lab, examined him and proceeded with cardiac catheterization  and intervention. The start time of the procedure was 1210 and end time was 1330. The  patient was given 5 mg of Versed and 75 mcg of fentanyl by my order by  the qualified independent observer, Priscilla Dave RN. The entire cardiac team  and myself monitored his cardiopulmonary status during the entire  procedure. DESCRIPTION OF PROCEDURE:  A 6-Sinhala sheath was placed in a retrograde  manner into the right femoral artery over guidewire. It was aspirated  and flushed. JR4 catheter was used to engage the right coronary artery. Injection shows dominant right coronary artery with 75% stenosis in the  right coronary artery proximally. The right PDA was patent and the  right posterolateral branch was patent. No collaterals to the LAD were  noted. A JR4 guide was used to engage the left main coronary artery,  6-Sinhala. Injection reveals normal left main coronary artery.   The  early mid left anterior descending coronary artery has a hazy thrombotic  90 plus percent stenosis. There was a diagonal branch that was very  large, that was involved in the stenosis that also has thrombus burden  heavy and 90% narrowing. I placed BMW wire in the LAD, heavy support  wire in the diagonal.  I tried to place with the [employer] CAT RX  extraction catheter into the LAD and I could not. It would not pass. I  have to abandon this. I then went with the balloon angioplasty of the  LAD with two wires in place. I used a 2.5 x 20 mm balloon. This was  inflated 14 atmospheres x20 seconds. There was nearly RUDY-3 blood in  the LAD. The diagonal branch had significant disease still. I felt  that I could pass a stent into the ostium of the 90% diagonal branch  with thrombus and I felt that once I stented the LAD, I would not be  able to pass the stent there and it was a large artery. I tried to  protect it with a 3.0 x 12 mm Resolute [place] drug-eluting stent placed in  the ostium of the diagonal branch. It was inflated to 12 atmospheres  nominal pressure. There was RUDY-3 blood flow with less than 5%  residual stenosis. I then turned my attention to the LAD. I could not  pass the stent into the LAD to do a T-stent. I had to balloon the LAD  with a 3.0 x 20 balloon. I went to 14 atmospheres and then I was able  to pass the 3.5 mm x 22 mm Resolute [place] drug-eluting stent into the  LAD. It was an effective T-stent of the LAD and the diagonal branch. The LAD had RUDY-3 blood flow, but the diagonal branch had RUDY-1 flow. I attempted to place a balloon back into the diagonal branch and I could  not, after re-wiring and so what I did was I gave nitroglycerin 400 mcg  and then I gave adenosine 50 and then 100 mcg into the guide and then I  gave another 500 mcg of nitroglycerin into the guide and there was  RUDY-2 flow in the diagonal branch. It was fairly good-sized branch,  but there was RUDY-1 flow during the initial injections.   I had to  conclude the interventional procedure at that juncture. CONCLUSIONS:  1. Successful T-stenting of diagonal LAD. There was a 3.5 x 22 mm  length Resolute [place] drug-eluting stent in the LAD and 3.0 x 12 mm  Resolute [place] drug-eluting stent in the diagonal branch. RUDY-3 blood  flow in the LAD and RUDY-2 flow in the diagonal branch. LV ejection  fraction estimated at 40% on left ventriculography with EDP of 14 mmHg. 2.  Right coronary artery has 75% to 80% proximal coronary stenosis,  dominant right coronary artery and minor irregularities elsewhere. LV  EF 40%, anterolateral wall hypokinesia, significant LVEDP 14.  3.  Angio-Seal successful right femoral arteriotomy. PLAN:  1. Hydration. 2.  Bedrest.  3.  Integrilin 8 hours. Brilinta was given 180 mg on the table. Nitroglycerin drip 40 mcg per minute. Estimated blood loss was less than 25 mL.         Opal Hagan MD    D: 04/10/2022 13:55:33       T: 04/10/2022 16:13:39     [email or web address]  Job#: [id number]     Doc#: [id number]    CC:

## 2022-04-10 NOTE — PROGRESS NOTES
Pt admitted to room 226 from cath lab. Pt received 2 HOLDEN: one to LAD, one Diag. Pt currently has Nitro infusion running at 40mcg/min, Integrilin infusing 16ml/hr per [facility], turn off Integrilin at 2030 tonight. Pt has Right groin site, site has old drainage but is soft and nontender for patient. Pts family at bedside. Will continue to monitor.

## 2022-04-11 PROBLEM — I25.5 ISCHEMIC CARDIOMYOPATHY: Status: ACTIVE | Noted: 2022-04-11

## 2022-04-11 PROBLEM — I50.21 ACUTE SYSTOLIC HEART FAILURE (HCC): Status: ACTIVE | Noted: 2022-04-11

## 2022-04-11 LAB
ANION GAP SERPL CALCULATED.3IONS-SCNC: 9 MMOL/L (ref 3–16)
APTT: 32.5 SEC (ref 26.2–38.6)
APTT: 32.6 SEC (ref 26.2–38.6)
BUN BLDV-MCNC: 7 MG/DL (ref 7–20)
CALCIUM SERPL-MCNC: 8.7 MG/DL (ref 8.3–10.6)
CHLORIDE BLD-SCNC: 104 MMOL/L (ref 99–110)
CHOLESTEROL, TOTAL: 228 MG/DL (ref 0–199)
CO2: 21 MMOL/L (ref 21–32)
CREAT SERPL-MCNC: 0.8 MG/DL (ref 0.8–1.3)
EKG ATRIAL RATE: 67 BPM
EKG DIAGNOSIS: NORMAL
EKG P AXIS: 76 DEGREES
EKG P-R INTERVAL: 194 MS
EKG Q-T INTERVAL: 446 MS
EKG QRS DURATION: 94 MS
EKG QTC CALCULATION (BAZETT): 471 MS
EKG R AXIS: -43 DEGREES
EKG T AXIS: 104 DEGREES
EKG VENTRICULAR RATE: 67 BPM
GFR AFRICAN AMERICAN: >60
GFR NON-AFRICAN AMERICAN: >60
GLUCOSE BLD-MCNC: 125 MG/DL (ref 70–99)
HCT VFR BLD CALC: 43.9 % (ref 40.5–52.5)
HDLC SERPL-MCNC: 52 MG/DL (ref 40–60)
HEMOGLOBIN: 14.5 G/DL (ref 13.5–17.5)
LDL CHOLESTEROL CALCULATED: 153 MG/DL
LV EF: 43 %
LVEF MODALITY: NORMAL
MCH RBC QN AUTO: 33.2 PG (ref 26–34)
MCHC RBC AUTO-ENTMCNC: 32.9 G/DL (ref 31–36)
MCV RBC AUTO: 100.9 FL (ref 80–100)
PDW BLD-RTO: 14.4 % (ref 12.4–15.4)
PLATELET # BLD: 233 K/UL (ref 135–450)
PMV BLD AUTO: 7 FL (ref 5–10.5)
POTASSIUM REFLEX MAGNESIUM: 4.1 MMOL/L (ref 3.5–5.1)
RBC # BLD: 4.36 M/UL (ref 4.2–5.9)
SODIUM BLD-SCNC: 134 MMOL/L (ref 136–145)
TRIGL SERPL-MCNC: 113 MG/DL (ref 0–150)
VLDLC SERPL CALC-MCNC: 23 MG/DL
WBC # BLD: 8.9 K/UL (ref 4–11)

## 2022-04-11 PROCEDURE — C8929 TTE W OR WO FOL WCON,DOPPLER: HCPCS

## 2022-04-11 PROCEDURE — 6360000004 HC RX CONTRAST MEDICATION: Performed by: INTERNAL MEDICINE

## 2022-04-11 PROCEDURE — 2580000003 HC RX 258: Performed by: INTERNAL MEDICINE

## 2022-04-11 PROCEDURE — 6370000000 HC RX 637 (ALT 250 FOR IP): Performed by: INTERNAL MEDICINE

## 2022-04-11 PROCEDURE — 36415 COLL VENOUS BLD VENIPUNCTURE: CPT

## 2022-04-11 PROCEDURE — 85730 THROMBOPLASTIN TIME PARTIAL: CPT

## 2022-04-11 PROCEDURE — 83036 HEMOGLOBIN GLYCOSYLATED A1C: CPT

## 2022-04-11 PROCEDURE — 85027 COMPLETE CBC AUTOMATED: CPT

## 2022-04-11 PROCEDURE — 2000000000 HC ICU R&B

## 2022-04-11 PROCEDURE — 93010 ELECTROCARDIOGRAM REPORT: CPT | Performed by: INTERNAL MEDICINE

## 2022-04-11 PROCEDURE — 80048 BASIC METABOLIC PNL TOTAL CA: CPT

## 2022-04-11 PROCEDURE — 93005 ELECTROCARDIOGRAM TRACING: CPT | Performed by: INTERNAL MEDICINE

## 2022-04-11 PROCEDURE — 99233 SBSQ HOSP IP/OBS HIGH 50: CPT | Performed by: INTERNAL MEDICINE

## 2022-04-11 RX ORDER — NITROGLYCERIN 20 MG/100ML
30 INJECTION INTRAVENOUS CONTINUOUS
Status: DISCONTINUED | OUTPATIENT
Start: 2022-04-11 | End: 2022-04-12

## 2022-04-11 RX ORDER — ASPIRIN 81 MG/1
81 TABLET, CHEWABLE ORAL DAILY
Status: DISCONTINUED | OUTPATIENT
Start: 2022-04-11 | End: 2022-04-13 | Stop reason: HOSPADM

## 2022-04-11 RX ADMIN — TICAGRELOR 90 MG: 90 TABLET ORAL at 09:17

## 2022-04-11 RX ADMIN — TICAGRELOR 90 MG: 90 TABLET ORAL at 22:02

## 2022-04-11 RX ADMIN — ATORVASTATIN CALCIUM 80 MG: 80 TABLET, FILM COATED ORAL at 22:02

## 2022-04-11 RX ADMIN — ASPIRIN 81 MG 81 MG: 81 TABLET ORAL at 09:17

## 2022-04-11 RX ADMIN — SACUBITRIL AND VALSARTAN 1 TABLET: 24; 26 TABLET, FILM COATED ORAL at 09:17

## 2022-04-11 RX ADMIN — METOPROLOL SUCCINATE 25 MG: 25 TABLET, EXTENDED RELEASE ORAL at 09:17

## 2022-04-11 RX ADMIN — SODIUM CHLORIDE, PRESERVATIVE FREE 10 ML: 5 INJECTION INTRAVENOUS at 09:20

## 2022-04-11 RX ADMIN — SACUBITRIL AND VALSARTAN 1 TABLET: 24; 26 TABLET, FILM COATED ORAL at 22:02

## 2022-04-11 RX ADMIN — PERFLUTREN 1.65 MG: 6.52 INJECTION, SUSPENSION INTRAVENOUS at 09:15

## 2022-04-11 RX ADMIN — SODIUM CHLORIDE, PRESERVATIVE FREE 10 ML: 5 INJECTION INTRAVENOUS at 22:03

## 2022-04-11 ASSESSMENT — PAIN SCALES - GENERAL
PAINLEVEL_OUTOF10: 0

## 2022-04-11 NOTE — PROGRESS NOTES
Sent Dr. Sussy Yi text updating him on patient's condition - he is still having some mild chest discomfort - I gave him 650 Tylenol earlier and he is more comfortable now - Patient still having lots of ectopy. Had a 9 beat run of v-tach earlier and is still having multiform PVC's and intermittent trigemeny. Patient to remain in ICU for remainder of tonight, per Dr. Sussy Yi. Closely monitoring the patient and rhythm. Other vitals are stable, and patient is still on the Nitro drip at a parked rate of 30mcg.    Electronically signed by Rebecca Wolf RN on 4/11/2022 at 12:12 AM

## 2022-04-11 NOTE — PROGRESS NOTES
Echo shows EF 40 to 45% with LAD territory wall motion abnormalities. No significant valvular disease. Mild MR only. Blood pressure tolerating Entresto. Weaned from nitroglycerin drip with no active chest pain. Rhythms stable. Confirmed orders for transfer to .   We will see tomorrow    Blaire Kwan MD, [facility] [facility]  [phone] [facility]  [phone] [facility]

## 2022-04-11 NOTE — PROGRESS NOTES
CARDIOLOGY PROGRESS NOTE      Patient Name: Miriam Rosenthal  Date of admission: 4/10/2022 11:57 AM  Admission Dx: STEMI (ST elevation myocardial infarction) ([facility] [address] [I21.3]  Acute anterior myocardial infarction ([facility] [address] [I21.09]  Reason for Consult:  STEMI  Requesting Physician: Mona Ware MD  Primary Care physician: Tena Morley MD    Subjective:     Miriam Rosenthal is a 80 y.o. patient with prior medical history notable for only BPH and prior smoking history, who presented to the hospital with complaints of chest pain beginning at 10:30 AM day of admission. Identified is anterolateral STEMI and taken to Cath Lab for study. Doing well this AM. States no chest pain at present. Did have some overnight still, improved gradually. Remains on 30 mcg nitro IV gtt. HR's 50-60's. AIVR this AM. Couple runs NSVT yesterday. No palpitations, dizziness. No groin site concerns. No orthopnea/PND/dyspnea at rest.       Home Medications:  Were reviewed and are listed in nursing record and/or below  Prior to Admission medications    Medication Sig Start Date End Date Taking? Authorizing Provider   tamsulosin (FLOMAX) 0.4 MG capsule Take 0.4 mg by mouth 7/16/18   Historical Provider, MD        CURRENT Medications:  ticagrelor (BRILINTA) tablet 180 mg, Once  sodium chloride flush 0.9 % injection 5-40 mL, 2 times per day  sodium chloride flush 0.9 % injection 5-40 mL, PRN  0.9 % sodium chloride infusion, PRN  acetaminophen (TYLENOL) tablet 650 mg, Q4H PRN  ondansetron (ZOFRAN) injection 4 mg, Q6H PRN  atorvastatin (LIPITOR) tablet 80 mg, Nightly  metoprolol succinate (TOPROL XL) extended release tablet 25 mg, Daily  ticagrelor (BRILINTA) tablet 90 mg, BID  morphine (PF) injection 2 mg, Q3H PRN  ALPRAZolam (XANAX) tablet 0.25 mg, BID PRN  nitroGLYCERIN 50 mg in dextrose 5% 250 mL infusion, Continuous        Allergies:  Pcn [penicillins]     Review of Systems:   A 14 point review of symptoms completed. Pertinent positives identified in the HPI, all other review of symptoms negative. Objective:     Vitals:    04/11/22 0300 04/11/22 0400 04/11/22 0500 04/11/22 0600   BP: (!) 126/92 123/86 127/82 101/61   Pulse: 59 56 52 61   Resp: 17 13 11 14   Temp:       TempSrc:       SpO2: 94% 94% 95% 94%   Weight:       Height:          Weight: 220 lb (99.8 kg)       PHYSICAL EXAM:    General:  Alert, cooperative, no distress, appears stated age   Head:  Normocephalic, atraumatic   Eyes:  Conjunctiva/corneas clear, anicteric sclerae    Nose: Nares normal, no drainage or sinus tenderness   Throat: No abnormalities of the lips, oral mucosa or tongue. Neck: Trachea midline. Neck supple with no lymphadenopathy, thyroid not enlarged, symmetric, no tenderness/mass/nodules, no Jugular venous pressure elevation    Lungs:   Clear to auscultation bilaterally, no wheezes, no rales, no respiratory distress   Chest Wall:  No deformity or tenderness to palpation   Heart:  Regular rate and rhythm, normal S1, normal S2, no murmur, no rub, no S3/S4, PMI non-palpable. Abdomen:   Central adiposity, Soft, non-tender, with normoactive bowel sounds. No masses, no hepatosplenomegaly   Extremities: No cyanosis, clubbing or pitting edema. Vascular: 2+ radial, R femoral site c/d/i with no audible bruit, 2+ dorsalis pedis and posterior tibial pulses bilaterally. Brisk carotid upstrokes without carotid bruit. Skin: Skin color, texture, turgor are normal with no rashes or ulceration. Pysch: Euthymic mood, appropriate affect   Neurologic: Oriented to person, place and time. No slurred speech or facial asymmetry. No motor or sensory deficits on gross examination.          Labs:   CBC:   Lab Results   Component Value Date    WBC 8.9 04/11/2022    RBC 4.36 04/11/2022    HGB 14.5 04/11/2022    HCT 43.9 04/11/2022    .9 04/11/2022    RDW 14.4 04/11/2022     04/11/2022     CMP:  Lab Results   Component Value Date     04/11/2022    K 4.1 04/11/2022     04/11/2022    CO2 21 04/11/2022    BUN 7 04/11/2022    CREATININE 0.8 04/11/2022    GFRAA >60 04/11/2022    AGRATIO 1.6 04/10/2022    LABGLOM >60 04/11/2022    GLUCOSE 125 04/11/2022    PROT 7.7 04/10/2022    CALCIUM 8.7 04/11/2022    BILITOT 0.4 04/10/2022    ALKPHOS 61 04/10/2022    AST 68 04/10/2022    ALT 27 04/10/2022     PT/INR:  No results found for: PTINR  HgBA1c:No results found for: LABA1C  Lab Results   Component Value Date    TROPONINI 0.37 (H) 04/10/2022         Interval Testing/Data:     Telemetry personally reviewed:     Cardiac catheterization 4/10  Right coronary 75% stenosis proximal vessel  90% hazy, thrombotic stenosis proximal portion mid left anterior descending  Large first diagonal, 90% stenosis, large clot burden  Unable to place Penumbra for aspiration thrombectomy, abandoned, with subsequent PCI LAD, D1.   3.0 x 12 mm Resolute [place] drug-eluting stent to ostial diagonal.   3.5 mm x 22 mm Resolute [place] drug-eluting stent into the  LAD subsequently. It was an effective T-stent of the LAD and the diagonal branch. The LAD had RUDY-3 blood flow, but the diagonal branch had RUDY-1 flow refractory to multiple medications and could not pass balloon back into diagonal.   Technically difficult PCI procedure. CONCLUSIONS:  1. Successful T-stenting of diagonal LAD. There was a 3.5 x 22 mm  length Resolute [place] drug-eluting stent in the LAD and 3.0 x 12 mm  Resolute [place] drug-eluting stent in the diagonal branch. RUDY-3 blood  flow in the LAD and RUDY-2 flow in the diagonal branch. LV ejection  fraction estimated at 40% on left ventriculography with EDP of 14 mmHg. 2.  Right coronary artery has 75% to 80% proximal coronary stenosis,  dominant right coronary artery and minor irregularities elsewhere. LV  EF 40%, anterolateral wall hypokinesia, significant LVEDP 14.  3.  Angio-Seal successful right femoral arteriotomy.     PLAN:  1. Hydration.   2. Bedrest.  3.  Integrilin 8 hours. Brilinta was given 180 mg on the table. Nitroglycerin drip 40 mcg per minute. Impression and Plan      1. CAD with anterolateral STEMI, status post PCI LAD/Diagonal  2. Ischemic CM with LVEF 40%  3. Prior tobacco use, now quit      Patient Active Problem List   Diagnosis    Acute ST elevation myocardial infarction (STEMI) due to occlusion of left anterior descending (LAD) coronary artery ([facility]    STEMI (ST elevation myocardial infarction) ([facility]    Acute anterior myocardial infarction ([facility] [address]       PLAN:  1. Continue baby aspirin daily, Brilinta twice daily. Wean nitro gtt as able. 2.  Continue high intensity Lipitor  3. Metoprolol XL 25 mg once daily  4. Add low dose entresto. 5.  Will obtain transthoracic echocardiogram for evaluation of underlying cardiac structure and function. 6.  Monitor rhythms  7. FLP pending, add A1c  8. EKG x 1 this AM.       I will address the patient's cardiac risk factors and adjusted pharmacologic treatment as needed. In addition, I have reinforced the need for patient directed risk factor modification. All questions and concerns were addressed to the patient/family. Alternatives to my treatment were discussed. Thank you for allowing us to participate in the care of Emmanuel Guidry. Please call me with any questions [phone].     Kirill Pappas MD, [facility]  Cardiovascular Disease  [facility] [place]  [phone] [facility]  [phone] [address] [place] [address]  4/11/2022 7:58 AM

## 2022-04-11 NOTE — PROGRESS NOTES
Progress Note      PCP: Marcey Snellen, MD    Date of Admission: 4/10/2022    Chief Complaint   Patient presents with    Chest Pain     Started 1030 this morning. Stemi per EMS. Asa 324 mg per EMS, Fentanyl 100 mcg iv, Zofran 4 mg per EMS     Hospital Course: Denis Bloom a 80 y.o. Male who presented to the [facility] ED  with complaints of severe chest pain in AM with some warning pains over last few days. Chest pain was described as sharp, central in location associated with tingling in his arms . He related his arm pain to arthritis initially. No nausea nor SOB nor presyncope.  No bleeding or anticipated surgery in next year. Reports being very active normally. EMS triage showed anterior STEMI and intitial troponin 0.37 and cath lab was activated at [facility]. Pt had PCI with HOLDEN x2 in LAD with Dr. Ann Silveira, was admitted to ICU post-cath for care. Subjective:   Pt with ectopy, 9 beat run of V-tach, multiform PVCs, intermittent trigemeny overnight. Pt admits to some palpitations at that time but denies dizziness. Denies any further symptoms. Pt had some mild chest discomfort relieved with Tylenol 650 mg. Cardiology, Dr. Heena Hinton, saw pt this morning and ordered for EKG and Echo Complete to be done today - not yet read. Nurse given verbal order per cardiology to wean pt off nitro IV gtt, currently at 30 mcg/min. Vitals stable, HR in 50-60s.  Pt started on toprol XL this AM.      Medications:  Reviewed    Infusion Medications    nitroGLYCERIN Stopped (04/11/22 1335)    sodium chloride       Scheduled Medications    aspirin  81 mg Oral Daily    sacubitril-valsartan  1 tablet Oral BID    mupirocin   Nasal BID    ticagrelor  180 mg Oral Once    sodium chloride flush  5-40 mL IntraVENous 2 times per day    atorvastatin  80 mg Oral Nightly    metoprolol succinate  25 mg Oral Daily    ticagrelor  90 mg Oral BID     PRN Meds: sodium chloride flush, sodium chloride, acetaminophen, ondansetron, morphine, ALPRAZolam      Intake/Output Summary (Last 24 hours) at 4/11/2022 1604  Last data filed at 4/11/2022 1520  Gross per 24 hour   Intake --   Output 3390 ml   Net -3390 ml       Physical Exam Performed:    /84   Pulse 73   Temp 98.6 °F (37 °C) (Oral)   Resp 22   Ht 6' 1\" (1.854 m)   Wt 220 lb (99.8 kg)   SpO2 96%   BMI 29.03 kg/m²     General appearance: No apparent distress, appears stated age and cooperative. HEENT: Pupils equal, round, and reactive to light. Conjunctivae/corneas clear. Neck: Supple, with full range of motion. No jugular venous distention. Trachea midline. Respiratory:  Normal respiratory effort. Clear to auscultation, bilaterally without Rales/Wheezes/Rhonchi. Cardiovascular: Regular rate and rhythm with normal S1/S2 without murmurs, rubs or gallops. Abdomen: Soft, non-tender, non-distended with normal bowel sounds. Musculoskeletal: No clubbing, cyanosis or edema bilaterally. Full range of motion without deformity. Skin: Skin color, texture, turgor normal.  No rashes or lesions. Neurologic:  Neurovascularly intact without any focal sensory/motor deficits.  Cranial nerves: II-XII intact, grossly non-focal.  Psychiatric: Alert and oriented, thought content appropriate, normal insight  Capillary Refill: Brisk,3 seconds, normal   Peripheral Pulses: +2 palpable, equal bilaterally       Labs:   Recent Labs     04/10/22  1200 04/11/22  0418   WBC 7.6 8.9   HGB 16.1 14.5   HCT 48.8 43.9    233     Recent Labs     04/10/22  1200 04/11/22  0418    134*   K 4.3 4.1    104   CO2 23 21   BUN 11 7   CREATININE 1.0 0.8   CALCIUM 9.4 8.7     Recent Labs     04/10/22  1200   AST 68*   ALT 27   BILITOT 0.4   ALKPHOS 61     Recent Labs     04/10/22  1200   INR 0.96     Recent Labs     04/10/22  1200   TROPONINI 0.37*     Radiology:  XR CHEST PORTABLE   Final Result   Small left basilar pneumonia           Active Hospital Problems    Diagnosis     Acute systolic heart failure ([facility] [address] [I50.21]     Ischemic cardiomyopathy [I25.5]     STEMI (ST elevation myocardial infarction) ([facility] [address] [I21.3]     Acute anterior myocardial infarction ([facility] [address] [I21.09]     Acute ST elevation myocardial infarction (STEMI) due to occlusion of left anterior descending (LAD) coronary artery (HCC) [I21.02]      Assessment/Plan:  #Chest pain due to CAD/Anterior wall STEMI  - s/p emergent LHC and PCI with HOLDEN x2 to LAD on 4/10/22 by Dr. Mary Kay Flores  - Admitted to ICU and follow Post Cath protocol per cardiology   - Continue GDMT with aspirin daily, Brilinta BID, and high-intensity atorvastatin. Cardiology added low-dose entresto  - Started metoprolol succinate 25 mg daily this AM, will monitor HR  - Weaning off nitro gtt as able per cardiology  - Fasting lipid panel and A1c pending  - Due for Echo complete today to evaluate underlying cardiac structure and function, pending read  - Due for repeat EKG this AM    - Discussed with pt and family at bedside of RCA with some blockage that will be re-evaluated at a later date after this initial therapy  - Pt counseled on no further smoking in light of his condition    #Hx of BPH s/p prostatectomy  - Stable  - Patient reports that he doesn't take flomax anymore    DVT Prophylaxis: Integrilin gtt  Diet: ADULT DIET; Regular; Low Fat/Low Chol/High Fiber/ISMAEL; No Added Salt (3-4 gm)  Code Status: Full Code    PT/OT Eval Status: not yet ordered    Dispo - tomorrow pending clinical course    Maria Elena Stearns MD  PGY-1    Addendum to Resident  Progress note:  Pt seen,examined and evaluated with resident and discussed regarding POC . I have reviewed the current history, physical findings, labs and assessment and plan and agree with note as documented by resident DO ( Dr. Steve Walker)    Patient seen and examined with wife/daughter at bedside. Patient denies any new complaints this morning.    The patient and / or the family were informed of the results of tests, a time was given to answer questions, a plan was proposed and they agreed with plan. Currently patient being weaned off nitro gtt. and okay to transfer out of ICU to PCU and await echocardiogram results. Continue rest of the current care. Anticipate discharge in a.m.     Aakash Bruno MD  Hospitalist Physician

## 2022-04-11 NOTE — PROGRESS NOTES
Dr. Anna Rose at bedside to speak with pt. Verbal order to ween off nitroglycerin drip. Will continue to monitor.

## 2022-04-11 NOTE — PROGRESS NOTES
Progress Note      PCP: Starr Franco MD    Date of Admission: 4/10/2022    Chief Complaint   Patient presents with    Chest Pain     Started 1030 this morning. Stemi per EMS. Asa 324 mg per EMS, Fentanyl 100 mcg iv, Zofran 4 mg per EMS     Hospital Course: Barrett Pereyra a 80 y.o. Male who presented to the [facility] ED  with complaints of severe chest pain in AM with some warning pains over last few days. Chest pain was described as sharp, central in location associated with tingling in his arms . He related his arm pain to arthritis initially. No nausea nor SOB nor presyncope.  No bleeding or anticipated surgery in next year. Reports being very active normally. EMS triage showed anterior STEMI and intitial troponin 0.37 and cath lab was activated at [facility]. Pt had PCI with HOLDEN x2 in LAD with Dr. Donald Novoa, was admitted to ICU post-cath for care. Subjective:   Pt with ectopy, 9 beat run of V-tach, multiform PVCs, intermittent trigemeny overnight. Pt admits to some palpitations at that time but denies dizziness. Denies any further symptoms. Pt had some mild chest discomfort relieved with Tylenol 650 mg. Cardiology, Dr. Callie Arias, saw pt this morning and ordered for EKG and Echo Complete to be done today - not yet read. Nurse given verbal order per cardiology to wean pt off nitro IV gtt, currently at 30 mcg/min. Vitals stable, HR in 50-60s.  Pt started on toprol XL this AM.      Medications:  Reviewed    Infusion Medications    nitroGLYCERIN 30 mcg/min (04/11/22 1146)    sodium chloride       Scheduled Medications    aspirin  81 mg Oral Daily    sacubitril-valsartan  1 tablet Oral BID    mupirocin   Nasal BID    ticagrelor  180 mg Oral Once    sodium chloride flush  5-40 mL IntraVENous 2 times per day    atorvastatin  80 mg Oral Nightly    metoprolol succinate  25 mg Oral Daily    ticagrelor  90 mg Oral BID     PRN Meds: sodium chloride flush, sodium chloride, acetaminophen, ondansetron, morphine, ALPRAZolam      Intake/Output Summary (Last 24 hours) at 4/11/2022 1231  Last data filed at 4/11/2022 1000  Gross per 24 hour   Intake --   Output 2425 ml   Net -2425 ml       Physical Exam Performed:    /86   Pulse 60   Temp 98.6 °F (37 °C) (Oral)   Resp 9   Ht 6' 1\" (1.854 m)   Wt 220 lb (99.8 kg)   SpO2 96%   BMI 29.03 kg/m²     General appearance: No apparent distress, appears stated age and cooperative. HEENT: Pupils equal, round, and reactive to light. Conjunctivae/corneas clear. Neck: Supple, with full range of motion. No jugular venous distention. Trachea midline. Respiratory:  Normal respiratory effort. Clear to auscultation, bilaterally without Rales/Wheezes/Rhonchi. Cardiovascular: Regular rate and rhythm with normal S1/S2 without murmurs, rubs or gallops. Abdomen: Soft, non-tender, non-distended with normal bowel sounds. Musculoskeletal: No clubbing, cyanosis or edema bilaterally. Full range of motion without deformity. Skin: Skin color, texture, turgor normal.  No rashes or lesions. Neurologic:  Neurovascularly intact without any focal sensory/motor deficits.  Cranial nerves: II-XII intact, grossly non-focal.  Psychiatric: Alert and oriented, thought content appropriate, normal insight  Capillary Refill: Brisk,3 seconds, normal   Peripheral Pulses: +2 palpable, equal bilaterally       Labs:   Recent Labs     04/10/22  1200 04/11/22  0418   WBC 7.6 8.9   HGB 16.1 14.5   HCT 48.8 43.9    233     Recent Labs     04/10/22  1200 04/11/22  0418    134*   K 4.3 4.1    104   CO2 23 21   BUN 11 7   CREATININE 1.0 0.8   CALCIUM 9.4 8.7     Recent Labs     04/10/22  1200   AST 68*   ALT 27   BILITOT 0.4   ALKPHOS 61     Recent Labs     04/10/22  1200   INR 0.96     Recent Labs     04/10/22  1200   TROPONINI 0.37*       Urinalysis:      Lab Results   Component Value Date    NITRU Negative 04/30/2019    WBCUA 0-2 04/30/2019    RBCUA 10-20 04/30/2019    BLOODU LARGE 04/30/2019    SPECGRAV <=1.005 04/30/2019    GLUCOSEU Negative 04/30/2019       Radiology:  XR CHEST PORTABLE   Final Result   Small left basilar pneumonia             Assessment/Plan:    Active Hospital Problems    Diagnosis     STEMI (ST elevation myocardial infarction) ([facility] [address] [I21.3]     Acute anterior myocardial infarction ([facility] [address] [I21.09]     Acute ST elevation myocardial infarction (STEMI) due to occlusion of left anterior descending (LAD) coronary artery ([facility] [address] [I21.02]      #Chest pain due to CAD/Anterior wall STEMI  - s/p emergent LHC and PCI with HOLDEN x2 to LAD on 4/10/22 by Dr. Yuli Guzmán  - Admitted to ICU and follow Post Cath protocol per cardiology   - Continue GDMT with aspirin daily, Brilinta BID, and high-intensity atorvastatin. Cardiology added low-dose entresto  - Started metoprolol succinate 25 mg daily this AM, will monitor HR  - Weaning off nitro gtt as able per cardiology  - Fasting lipid panel and A1c pending  - Due for Echo complete today to evaluate underlying cardiac structure and function, pending read  - Due for repeat EKG this AM    - Discussed with pt and family at bedside of RCA with some blockage that will be re-evaluated at a later date after this initial therapy  - Pt counseled on no further smoking in light of his condition    #Hx of BPH s/p prostatectomy  - Stable  - Patient reports that he doesn't take flomax anymore      DVT Prophylaxis: Integrilin gtt  Diet: ADULT DIET;  Regular; Low Fat/Low Chol/High Fiber/ISMAEL; No Added Salt (3-4 gm)  Code Status: Full Code    PT/OT Eval Status: not yet ordered    Dispo - tomorrow pending clinical course    Jillian Baron MD  PGY-1

## 2022-04-12 PROBLEM — I48.91 ATRIAL FIBRILLATION (HCC): Status: ACTIVE | Noted: 2022-04-12

## 2022-04-12 PROBLEM — E78.2 MIXED HYPERLIPIDEMIA: Status: ACTIVE | Noted: 2022-04-12

## 2022-04-12 LAB
EKG ATRIAL RATE: 120 BPM
EKG DIAGNOSIS: NORMAL
EKG Q-T INTERVAL: 400 MS
EKG QRS DURATION: 90 MS
EKG QTC CALCULATION (BAZETT): 518 MS
EKG R AXIS: -57 DEGREES
EKG T AXIS: 114 DEGREES
EKG VENTRICULAR RATE: 101 BPM
ESTIMATED AVERAGE GLUCOSE: 114 MG/DL
HBA1C MFR BLD: 5.6 %

## 2022-04-12 PROCEDURE — 2060000000 HC ICU INTERMEDIATE R&B

## 2022-04-12 PROCEDURE — 93005 ELECTROCARDIOGRAM TRACING: CPT | Performed by: INTERNAL MEDICINE

## 2022-04-12 PROCEDURE — 2580000003 HC RX 258: Performed by: INTERNAL MEDICINE

## 2022-04-12 PROCEDURE — 99233 SBSQ HOSP IP/OBS HIGH 50: CPT | Performed by: INTERNAL MEDICINE

## 2022-04-12 PROCEDURE — 6370000000 HC RX 637 (ALT 250 FOR IP): Performed by: INTERNAL MEDICINE

## 2022-04-12 PROCEDURE — 93010 ELECTROCARDIOGRAM REPORT: CPT | Performed by: INTERNAL MEDICINE

## 2022-04-12 RX ORDER — METOPROLOL SUCCINATE 25 MG/1
25 TABLET, EXTENDED RELEASE ORAL 2 TIMES DAILY
Status: DISCONTINUED | OUTPATIENT
Start: 2022-04-12 | End: 2022-04-13 | Stop reason: HOSPADM

## 2022-04-12 RX ADMIN — SACUBITRIL AND VALSARTAN 1 TABLET: 24; 26 TABLET, FILM COATED ORAL at 09:52

## 2022-04-12 RX ADMIN — APIXABAN 5 MG: 5 TABLET, FILM COATED ORAL at 16:44

## 2022-04-12 RX ADMIN — SACUBITRIL AND VALSARTAN 1 TABLET: 24; 26 TABLET, FILM COATED ORAL at 23:24

## 2022-04-12 RX ADMIN — TICAGRELOR 90 MG: 90 TABLET ORAL at 09:31

## 2022-04-12 RX ADMIN — METOPROLOL SUCCINATE 25 MG: 25 TABLET, EXTENDED RELEASE ORAL at 09:30

## 2022-04-12 RX ADMIN — ATORVASTATIN CALCIUM 80 MG: 80 TABLET, FILM COATED ORAL at 23:24

## 2022-04-12 RX ADMIN — ASPIRIN 81 MG 81 MG: 81 TABLET ORAL at 09:31

## 2022-04-12 RX ADMIN — SODIUM CHLORIDE, PRESERVATIVE FREE 10 ML: 5 INJECTION INTRAVENOUS at 09:35

## 2022-04-12 RX ADMIN — SODIUM CHLORIDE, PRESERVATIVE FREE 10 ML: 5 INJECTION INTRAVENOUS at 23:25

## 2022-04-12 RX ADMIN — TICAGRELOR 90 MG: 90 TABLET ORAL at 23:24

## 2022-04-12 RX ADMIN — METOPROLOL SUCCINATE 25 MG: 25 TABLET, EXTENDED RELEASE ORAL at 23:23

## 2022-04-12 ASSESSMENT — PAIN SCALES - GENERAL
PAINLEVEL_OUTOF10: 0

## 2022-04-12 NOTE — PROGRESS NOTES
4 Eyes Skin Assessment     The patient is being assess for   Shift Handoff    I agree that 2 RN's have performed a thorough Head to Toe Skin Assessment on the patient. ALL assessment sites listed below have been assessed. Areas assessed for pressure by both nurses:   [x]   Head, Face, and Ears   [x]   Shoulders, Back, and Chest, Abdomen  [x]   Arms, Elbows, and Hands   [x]   Coccyx, Sacrum, and Ischium  [x]   Legs, Feet, and Heels        Skin Assessed Under all Medical Devices by both nurses:                All Mepilex Borders were peeled back and area peeked at by both nurses:  Yes  Please list where Mepilex Borders are located:  coccyx              **SHARE this note so that the co-signing nurse is able to place an eSignature**    Co-signer eSignature: Electronically signed by Colin Pantoja RN on 4/12/22 at 4:26 AM EDT    Does the Patient have Skin Breakdown related to pressure?   No     (Insert Photo here )         Domingo Prevention initiated:  NA   Wound Care Orders initiated:  NA      [facility] nurse consulted for Pressure Injury (Stage 3,4, Unstageable, DTI, NWPT, Complex wounds)and New or Established Ostomies:  NA      Primary Nurse eSignature: Electronically signed by Laree Najjar, RN on 4/12/22 at 4:20 AM EDT

## 2022-04-12 NOTE — PROGRESS NOTES
Progress Note      PCP: Mika Fink MD    Date of Admission: 4/10/2022    Chief Complaint   Patient presents with    Chest Pain     Started 1030 this morning. Stemi per EMS. Asa 324 mg per EMS, Fentanyl 100 mcg iv, Zofran 4 mg per EMS     Hospital Course: Yared Peralta a 80 y.o. Male who presented to the [facility] ED  with complaints of severe chest pain in AM with some warning pains over last few days. Chest pain was described as sharp, central in location associated with tingling in his arms . He related his arm pain to arthritis initially. No nausea nor SOB nor presyncope.  No bleeding or anticipated surgery in next year. Reports being very active normally. EMS triage showed anterior STEMI and intitial troponin 0.37 and cath lab was activated at [facility]. Pt had PCI with HOLDEN x2 in LAD with Dr. Rustam Emanuel, was admitted to ICU post-cath for care on nitro drip. Echo with EF 40-45% w/ LAD territory wall motion abnormalities, but no significant valvular disease. Pt did well starting new medication Entresto, in addition to the beta-blocker and statin. Subjective:   No acute events overnight. Pt transferred from ICU when off nitro drip. Patient tolerating oral medications well, including toprol XL, statin, and Entresto. Vitals stable, HR in 60-70s. BP dipped to 90s/60s overnight. Lipids somewhat elevated, HbA1c is 5.6%. Patient converted to atrial fibrillation around 1215-12:30 PM today with heart rates 120 or less. Pt denied dizziness, chest pain, or palpitations.     Medications:  Reviewed    Infusion Medications    nitroGLYCERIN Stopped (04/11/22 1335)    sodium chloride       Scheduled Medications    aspirin  81 mg Oral Daily    sacubitril-valsartan  1 tablet Oral BID    mupirocin   Nasal BID    sodium chloride flush  5-40 mL IntraVENous 2 times per day    atorvastatin  80 mg Oral Nightly    metoprolol succinate  25 mg Oral Daily    ticagrelor  90 mg Oral BID     PRN Meds: sodium chloride flush, sodium chloride, acetaminophen, ondansetron, morphine, ALPRAZolam      Intake/Output Summary (Last 24 hours) at 4/12/2022 0848  Last data filed at 4/12/2022 0500  Gross per 24 hour   Intake --   Output 2625 ml   Net -2625 ml       Physical Exam Performed:    BP 93/63   Pulse 61   Temp 98.7 °F (37.1 °C) (Oral)   Resp 10   Ht 6' 1\" (1.854 m)   Wt 208 lb 12.4 oz (94.7 kg)   SpO2 96%   BMI 27.54 kg/m²     General appearance: No apparent distress, appears stated age and cooperative. HEENT: Pupils equal, round, and reactive to light. Conjunctivae/corneas clear. Neck: Supple, with full range of motion. No jugular venous distention. Trachea midline. Respiratory:  Normal respiratory effort. Clear to auscultation, bilaterally without Rales/Wheezes/Rhonchi. Cardiovascular: Regular rate and rhythm with normal S1/S2 without murmurs, rubs or gallops. Abdomen: Soft, non-tender, non-distended with normal bowel sounds. Musculoskeletal: No clubbing, cyanosis or edema bilaterally. Full range of motion without deformity. Skin: Skin color, texture, turgor normal.  No rashes or lesions. Neurologic:  Neurovascularly intact without any focal sensory/motor deficits.  Cranial nerves: II-XII intact, grossly non-focal.  Psychiatric: Alert and oriented, thought content appropriate, normal insight  Capillary Refill: Brisk,3 seconds, normal   Peripheral Pulses: +2 palpable, equal bilaterally       Labs:   Recent Labs     04/10/22  1200 04/11/22  0418   WBC 7.6 8.9   HGB 16.1 14.5   HCT 48.8 43.9    233     Recent Labs     04/10/22  1200 04/11/22  0418    134*   K 4.3 4.1    104   CO2 23 21   BUN 11 7   CREATININE 1.0 0.8   CALCIUM 9.4 8.7     Recent Labs     04/10/22  1200   AST 68*   ALT 27   BILITOT 0.4   ALKPHOS 61     Recent Labs     04/10/22  1200   INR 0.96     Recent Labs     04/10/22  1200   TROPONINI 0.37*     Radiology:  XR CHEST PORTABLE   Final Result   Small left basilar pneumonia Active Hospital Problems    Diagnosis     Mixed hyperlipidemia [E78.2]     Acute systolic heart failure (HCC) [I50.21]     Ischemic cardiomyopathy [I25.5]     STEMI (ST elevation myocardial infarction) ([facility] [address] [I21.3]     Acute anterior myocardial infarction ([facility] [address] [I21.09]     Acute ST elevation myocardial infarction (STEMI) due to occlusion of left anterior descending (LAD) coronary artery (HCC) [I21.02]      Assessment/Plan:  #Chest pain due to CAD/Anterior wall STEMI  - s/p emergent LHC and PCI with HOLDEN x2 to LAD on 4/10/22 by Dr. Anya Treadwell  - Admitted to ICU and follow Post Cath protocol per cardiology   - Continue GDMT with aspirin daily, Brilinta BID, and high-intensity atorvastatin. And low-dose entresto  - Metoprolol succinate 25 mg increased to BID, HR in 60-70s  - Weaned off nitro gtt 4/11  - Fasting lipid panel with elevated cholesterol and LDL. A1c 5.6%  - Echo 4/11 shows EF 40 to 45% with LAD territory wall motion abnormalities. No significant valvular disease. Mild MR only. - Discussed with pt and family at bedside of RCA with some blockage that will be re-evaluated at a later date after this initial therapy  - Pt counseled on no further smoking in light of his condition    #New onset atrial fibrillation  - Started around noon  [id number] Score: 2 For history of vascular disease and age. Disclaimer: Risk Score calculation is dependent on accuracy of patient problem list and past encounter diagnosis. - Per cardiology, plan for triple therapy for 1 month, then discontinue aspirin with continuation of P2Y12 + DOAC. - Added PPI to be continued while on triple therapy for 1 month  - Will need 2 week even monitor at discharge. #Hx of BPH s/p prostatectomy  - Stable  - Patient reports that he doesn't take flomax anymore    DVT Prophylaxis: Integrilin gtt  Diet: ADULT DIET;  Regular; Low Fat/Low Chol/High Fiber/ISMAEL; No Added Salt (3-4 gm)  Code Status: Full Code    PT/OT Eval Status: not yet

## 2022-04-12 NOTE — PROGRESS NOTES
CARDIOLOGY PROGRESS NOTE      Patient Name: Kennedy Overton  Date of admission: 4/10/2022 11:57 AM  Admission Dx: STEMI (ST elevation myocardial infarction) ([facility] [address] [I21.3]  Acute anterior myocardial infarction ([facility] [address] [I21.09]  Reason for Consult:  STEMI  Requesting Physician: Jasmeet Phipps MD  Primary Care physician: Kalpesh Randolph MD    Subjective:     Kennedy Overton is a 80 y.o. patient with prior medical history notable for only BPH and prior smoking history, who presented to the hospital with complaints of chest pain beginning at 10:30 AM day of admission. Identified is anterolateral STEMI and taken to Cath Lab for study. Today, noted nitroglycerin weaned off yesterday afternoon. Tolerated Entresto. No recurrence of NSVT per monitor. Does look like he converted to atrial fibrillation around 1215-12:30 PM today with heart rates 120 or less. No access site concerns. He was transitioned to C4. Patient notes he feels well today. His wife is present here with him today. He denies chest pain overnight. Denies palpitations this afternoon. Denies dizziness. Home Medications:  Were reviewed and are listed in nursing record and/or below  Prior to Admission medications    Medication Sig Start Date End Date Taking?  Authorizing Provider   tamsulosin (FLOMAX) 0.4 MG capsule Take 0.4 mg by mouth daily  7/16/18   Historical Provider, MD        CURRENT Medications:  aspirin chewable tablet 81 mg, Daily  sacubitril-valsartan (ENTRESTO) 24-26 MG per tablet 1 tablet, BID  nitroGLYCERIN 50 mg in dextrose 5% 250 mL infusion, Continuous  sodium chloride flush 0.9 % injection 5-40 mL, 2 times per day  sodium chloride flush 0.9 % injection 5-40 mL, PRN  0.9 % sodium chloride infusion, PRN  acetaminophen (TYLENOL) tablet 650 mg, Q4H PRN  ondansetron (ZOFRAN) injection 4 mg, Q6H PRN  atorvastatin (LIPITOR) tablet 80 mg, Nightly  metoprolol succinate (TOPROL XL) extended release tablet 25 mg, Daily  ticagrelor (BRILINTA) tablet 90 mg, BID  morphine (PF) injection 2 mg, Q3H PRN  ALPRAZolam (XANAX) tablet 0.25 mg, BID PRN        Allergies:  Pcn [penicillins]     Review of Systems:   A 14 point review of symptoms completed. Pertinent positives identified in the HPI, all other review of symptoms negative. Objective:     Vitals:    04/12/22 0700 04/12/22 0800 04/12/22 0930 04/12/22 1022   BP: 118/84 (!) 118/92 116/74 110/71   Pulse: 70 68 81 80   Resp: 14 17 16   Temp:  98.8 °F (37.1 °C)  98.1 °F (36.7 °C)   TempSrc:  Oral  Oral   SpO2: 96% 96% 96% 98%   Weight:       Height:          Weight: 208 lb 12.4 oz (94.7 kg)       PHYSICAL EXAM:    General:  Alert, cooperative, no distress, appears stated age   Head:  Normocephalic, atraumatic   Eyes:  Conjunctiva/corneas clear, anicteric sclerae    Nose: Nares normal, no drainage or sinus tenderness   Throat: No abnormalities of the lips, oral mucosa or tongue. Neck: Trachea midline. Neck supple with no lymphadenopathy, thyroid not enlarged, symmetric, no tenderness/mass/nodules, no Jugular venous pressure elevation    Lungs:   Clear to auscultation bilaterally, no wheezes, no rales, no respiratory distress   Chest Wall:  No deformity or tenderness to palpation   Heart:   Irregular, rate controlled, variable S1, normal S2, no murmur, no rub, no S3/S4, PMI non-palpable. Abdomen:   Central adiposity, Soft, non-tender, with normoactive bowel sounds. No masses, no hepatosplenomegaly   Extremities: No cyanosis, clubbing or pitting edema. Vascular: 2+ radial, R femoral site c/d/i with no audible bruit, 2+ dorsalis pedis and posterior tibial pulses bilaterally. Brisk carotid upstrokes without carotid bruit. Skin: Skin color, texture, turgor are normal with no rashes or ulceration. Pysch: Euthymic mood, appropriate affect   Neurologic: Oriented to person, place and time. No slurred speech or facial asymmetry.  No motor or sensory deficits on gross examination. Labs:   CBC:   Lab Results   Component Value Date    WBC 8.9 04/11/2022    RBC 4.36 04/11/2022    HGB 14.5 04/11/2022    HCT 43.9 04/11/2022    .9 04/11/2022    RDW 14.4 04/11/2022     04/11/2022     CMP:  Lab Results   Component Value Date     04/11/2022    K 4.1 04/11/2022     04/11/2022    CO2 21 04/11/2022    BUN 7 04/11/2022    CREATININE 0.8 04/11/2022    GFRAA >60 04/11/2022    AGRATIO 1.6 04/10/2022    LABGLOM >60 04/11/2022    GLUCOSE 125 04/11/2022    PROT 7.7 04/10/2022    CALCIUM 8.7 04/11/2022    BILITOT 0.4 04/10/2022    ALKPHOS 61 04/10/2022    AST 68 04/10/2022    ALT 27 04/10/2022     PT/INR:  No results found for: PTINR  HgBA1c:  Lab Results   Component Value Date    LABA1C 5.6 04/11/2022     Lab Results   Component Value Date    TROPONINI 0.37 (H) 04/10/2022         Interval Testing/Data:     Telemetry personally reviewed: Abrupt onset atrial fibrillation around 12:15 PM with rates ranging 100-115's. Cardiac catheterization 4/10  Right coronary 75% stenosis proximal vessel  90% hazy, thrombotic stenosis proximal portion mid left anterior descending  Large first diagonal, 90% stenosis, large clot burden  Unable to place Penumbra for aspiration thrombectomy, abandoned, with subsequent PCI LAD, D1.   3.0 x 12 mm Resolute Benjamín drug-eluting stent to ostial diagonal.   3.5 mm x 22 mm Resolute Crapo drug-eluting stent into the  LAD subsequently. It was an effective T-stent of the LAD and the diagonal branch. The LAD had RUDY-3 blood flow, but the diagonal branch had RUDY-1 flow refractory to multiple medications and could not pass balloon back into diagonal.   Technically difficult PCI procedure. CONCLUSIONS:  1. Successful T-stenting of diagonal LAD. There was a 3.5 x 22 mm  length Resolute Crapo drug-eluting stent in the LAD and 3.0 x 12 mm  Resolute Crapo drug-eluting stent in the diagonal branch.   RUDY-3 blood  flow in the LAD and RUDY-2 flow in the diagonal branch. LV ejection  fraction estimated at 40% on left ventriculography with EDP of 14 mmHg. 2.  Right coronary artery has 75% to 80% proximal coronary stenosis,  dominant right coronary artery and minor irregularities elsewhere. LV  EF 40%, anterolateral wall hypokinesia, significant LVEDP 14.  3.  Angio-Seal successful right femoral arteriotomy.     PLAN:  1. Hydration. 2.  Bedrest.  3.  Integrilin 8 hours. Brilinta was given 180 mg on the table. Nitroglycerin drip 40 mcg per minute. Impression and Plan      1. CAD with anterolateral STEMI, status post PCI LAD/Diagonal  2. Ischemic CM with LVEF 40%  3. Atrial fibrillation, new onset  4. Hyperlipidemia, , triglycerides 113   5. Prior tobacco use, now quit    [id number] Score for Atrial Fibrillation Stroke Risk   Risk   Factors  Component Value   C CHF No 0   H HTN No 0   A2 Age >= 76 No,  (70 y.o.) 0   D DM No 0   S2 Prior Stroke/TIA No 0   V Vascular Disease Yes 1   A Age 74-69 Yes,  (70 y.o.) 1   Sc Sex male 0    [id number]  Score  2   Score last updated 4/12/22 0:08 PM EDT    Click here for a link to the UpToDate guideline \"Atrial Fibrillation: Anticoagulation therapy to prevent embolization    Disclaimer: Risk Score calculation is dependent on accuracy of patient problem list and past encounter diagnosis. Patient Active Problem List   Diagnosis    Acute ST elevation myocardial infarction (STEMI) due to occlusion of left anterior descending (LAD) coronary artery ([address]    STEMI (ST elevation myocardial infarction) ([address]    Acute anterior myocardial infarction ([address]    Acute systolic heart failure ([address]    Ischemic cardiomyopathy    Mixed hyperlipidemia       PLAN:  1. Continue baby aspirin daily, Brilinta twice daily. New diagnosis AF, CHADS2. Warrants anti-coagulation. Plan for triple therapy x 1 month with DC aspirin after this time and continuation of P2Y12 + DOAC. 2.  Continue high intensity Lipitor  3. Metoprolol XL 25 mg -increase to twice daily dosing  4. Add low dose entresto. 5.  Monitor rhythms  6. Add PPI to be continued while taking triple therapy x 1 month. Keep overnight for rate control. Plan DC tomorrow. 2 week monitor at discharge. If remains in atrial fibrillation at follow up we will plan OP DCCV. I will address the patient's cardiac risk factors and adjusted pharmacologic treatment as needed. In addition, I have reinforced the need for patient directed risk factor modification. All questions and concerns were addressed to the patient/family. Alternatives to my treatment were discussed. Thank you for allowing us to participate in the care of Ac Kline. Please call me with any questions [phone].     Indu Bird MD, [facility]  Cardiovascular Disease  [address]  [phone] [facility]  [phone] [address] [place] [address]  4/12/2022 1:08 PM

## 2022-04-13 ENCOUNTER — TELEPHONE (OUTPATIENT)
Dept: CARDIOLOGY | Age: 72
End: 2022-04-13

## 2022-04-13 VITALS
DIASTOLIC BLOOD PRESSURE: 72 MMHG | OXYGEN SATURATION: 96 % | HEART RATE: 78 BPM | BODY MASS INDEX: 27.61 KG/M2 | TEMPERATURE: 97.5 F | WEIGHT: 208.3 LBS | SYSTOLIC BLOOD PRESSURE: 116 MMHG | HEIGHT: 73 IN | RESPIRATION RATE: 20 BRPM

## 2022-04-13 LAB
ANION GAP SERPL CALCULATED.3IONS-SCNC: 14 MMOL/L (ref 3–16)
BUN BLDV-MCNC: 22 MG/DL (ref 7–20)
CALCIUM SERPL-MCNC: 9.4 MG/DL (ref 8.3–10.6)
CHLORIDE BLD-SCNC: 100 MMOL/L (ref 99–110)
CO2: 18 MMOL/L (ref 21–32)
CREAT SERPL-MCNC: 0.9 MG/DL (ref 0.8–1.3)
GFR AFRICAN AMERICAN: >60
GFR NON-AFRICAN AMERICAN: >60
GLUCOSE BLD-MCNC: 128 MG/DL (ref 70–99)
HCT VFR BLD CALC: 51.7 % (ref 40.5–52.5)
HEMOGLOBIN: 17.3 G/DL (ref 13.5–17.5)
MCH RBC QN AUTO: 32.9 PG (ref 26–34)
MCHC RBC AUTO-ENTMCNC: 33.4 G/DL (ref 31–36)
MCV RBC AUTO: 98.5 FL (ref 80–100)
PDW BLD-RTO: 13.8 % (ref 12.4–15.4)
PLATELET # BLD: 303 K/UL (ref 135–450)
PMV BLD AUTO: 7.8 FL (ref 5–10.5)
POTASSIUM REFLEX MAGNESIUM: 3.9 MMOL/L (ref 3.5–5.1)
RBC # BLD: 5.25 M/UL (ref 4.2–5.9)
SODIUM BLD-SCNC: 132 MMOL/L (ref 136–145)
WBC # BLD: 11.2 K/UL (ref 4–11)

## 2022-04-13 PROCEDURE — 80048 BASIC METABOLIC PNL TOTAL CA: CPT

## 2022-04-13 PROCEDURE — 36415 COLL VENOUS BLD VENIPUNCTURE: CPT

## 2022-04-13 PROCEDURE — 6370000000 HC RX 637 (ALT 250 FOR IP): Performed by: INTERNAL MEDICINE

## 2022-04-13 PROCEDURE — 99233 SBSQ HOSP IP/OBS HIGH 50: CPT | Performed by: INTERNAL MEDICINE

## 2022-04-13 PROCEDURE — 93270 REMOTE 30 DAY ECG REV/REPORT: CPT | Performed by: INTERNAL MEDICINE

## 2022-04-13 PROCEDURE — 85027 COMPLETE CBC AUTOMATED: CPT

## 2022-04-13 RX ORDER — ATORVASTATIN CALCIUM 80 MG/1
80 TABLET, FILM COATED ORAL NIGHTLY
Qty: 30 TABLET | Refills: 3 | Status: CANCELLED | OUTPATIENT
Start: 2022-04-13

## 2022-04-13 RX ORDER — ATORVASTATIN CALCIUM 80 MG/1
80 TABLET, FILM COATED ORAL NIGHTLY
Qty: 30 TABLET | Refills: 3 | Status: SHIPPED | OUTPATIENT
Start: 2022-04-13 | End: 2022-07-26 | Stop reason: SDUPTHER

## 2022-04-13 RX ORDER — METOPROLOL SUCCINATE 25 MG/1
25 TABLET, EXTENDED RELEASE ORAL 2 TIMES DAILY
Qty: 30 TABLET | Refills: 3 | Status: SHIPPED | OUTPATIENT
Start: 2022-04-13 | End: 2022-04-20 | Stop reason: SDUPTHER

## 2022-04-13 RX ORDER — ASPIRIN 81 MG/1
81 TABLET, CHEWABLE ORAL DAILY
Qty: 30 TABLET | Refills: 0 | Status: CANCELLED | OUTPATIENT
Start: 2022-04-14

## 2022-04-13 RX ORDER — PANTOPRAZOLE SODIUM 40 MG/1
40 TABLET, DELAYED RELEASE ORAL
Status: DISCONTINUED | OUTPATIENT
Start: 2022-04-13 | End: 2022-04-13 | Stop reason: HOSPADM

## 2022-04-13 RX ORDER — ASPIRIN 81 MG/1
81 TABLET, CHEWABLE ORAL DAILY
Qty: 30 TABLET | Refills: 0 | Status: SHIPPED | OUTPATIENT
Start: 2022-04-14 | End: 2022-05-12 | Stop reason: SDUPTHER

## 2022-04-13 RX ORDER — METOPROLOL SUCCINATE 25 MG/1
25 TABLET, EXTENDED RELEASE ORAL 2 TIMES DAILY
Qty: 30 TABLET | Refills: 3 | Status: CANCELLED | OUTPATIENT
Start: 2022-04-13

## 2022-04-13 RX ORDER — PANTOPRAZOLE SODIUM 40 MG/1
40 TABLET, DELAYED RELEASE ORAL
Qty: 30 TABLET | Refills: 0 | Status: CANCELLED | OUTPATIENT
Start: 2022-04-14

## 2022-04-13 RX ORDER — PANTOPRAZOLE SODIUM 40 MG/1
40 TABLET, DELAYED RELEASE ORAL
Qty: 30 TABLET | Refills: 0 | Status: SHIPPED | OUTPATIENT
Start: 2022-04-14 | End: 2022-05-13 | Stop reason: ALTCHOICE

## 2022-04-13 RX ADMIN — PANTOPRAZOLE SODIUM 40 MG: 40 TABLET, DELAYED RELEASE ORAL at 09:33

## 2022-04-13 RX ADMIN — TICAGRELOR 90 MG: 90 TABLET ORAL at 07:31

## 2022-04-13 RX ADMIN — APIXABAN 5 MG: 5 TABLET, FILM COATED ORAL at 07:31

## 2022-04-13 RX ADMIN — SACUBITRIL AND VALSARTAN 1 TABLET: 24; 26 TABLET, FILM COATED ORAL at 07:31

## 2022-04-13 RX ADMIN — METOPROLOL SUCCINATE 25 MG: 25 TABLET, EXTENDED RELEASE ORAL at 07:30

## 2022-04-13 RX ADMIN — ASPIRIN 81 MG 81 MG: 81 TABLET ORAL at 07:31

## 2022-04-13 ASSESSMENT — PAIN SCALES - GENERAL
PAINLEVEL_OUTOF10: 0
PAINLEVEL_OUTOF10: 0

## 2022-04-13 NOTE — PROGRESS NOTES
Monitor company [employer]  Length of monitor 14 days  Monitor ordered by Nicolás Meneses MD  Patch ID [id number]  Device number [id number]  Nurse will apply at the time of discharge

## 2022-04-13 NOTE — DISCHARGE SUMMARY
Discharge Summary    Patient ID: Ino Wheeler      Patient's PCP: Wiliam Washington MD    Admit Date: 4/10/2022     Discharge Date:   4/13/2022    Admitting Physician: Wilfrido Pantoja MD     Discharge Physician: Wilfrido Pantoja MD     Discharge Diagnoses: Active Hospital Problems    Diagnosis     Mixed hyperlipidemia [E78.2]     Atrial fibrillation (HCC) [F40.10]     Acute systolic heart failure (HCC) [I50.21]     Ischemic cardiomyopathy [I25.5]     STEMI (ST elevation myocardial infarction) ([facility] [address] [I21.3]     Acute anterior myocardial infarction (HCC) [I21.09]     Acute ST elevation myocardial infarction (STEMI) due to occlusion of left anterior descending (LAD) coronary artery ([facility] [address] [I21.02]        The patient was seen and examined on day of discharge and this discharge summary is in conjunction with any daily progress note from day of discharge. Hospital Course:   Jazzy Ugalde is a 80 y.o. Male who presented to the [facility] ED  with complaints of severe chest pain in AM with some warning pains over last few days. Chest pain was described as sharp, central in location associated with tingling in his arms . He related his arm pain to arthritis initially. No nausea nor SOB nor presyncope. No bleeding or anticipated surgery in next year. Reports being very active normally. EMS triage showed anterior STEMI and intitial troponin 0.37 and cath lab was activated at [facility]. Pt had PCI with HOLDEN x2 in LAD with Dr. Althea Andrade, was admitted to ICU post-cath for care on nitro drip. Echo with EF 40-45% w/ LAD territory wall motion abnormalities, but no significant valvular disease. Pt did well starting new medication Entresto, in addition to the beta-blocker and statin. Patient converted to atrial fibrillation 4/12 around 1215-12:30 PM today with heart rates 120 or less. Pt started on apixaban 5 mg BID. Toprol XL increased to BID.      Pt had some nausea this AM when walking in his room, but this resolved without issues. Pt denied dizziness, chest pain, palpitations, SOB, n/v.    #Chest pain due to CAD/Anterior wall STEMI  - s/p emergent LHC and PCI with HOLDEN x2 to LAD on 4/10/22 by Dr. Radha Bobo  - Admitted to ICU and followed Post Cath protocol per cardiology   - Continue GDMT with aspirin daily, Brilinta BID, and high-intensity atorvastatin. Added low-dose entresto  - Metoprolol succinate 25 mg increased to BID, HR in 60-70s  - Weaned off nitro gtt 4/11  - Fasting lipid panel with elevated cholesterol and LDL. A1c 5.6%  - Echo 4/11 shows EF 40 to 45% with LAD territory wall motion abnormalities. No significant valvular disease. Mild MR only. - Discussed with pt and family at bedside of RCA with some blockage that will be re-evaluated at a later date after this initial therapy  - Pt counseled on no further smoking in light of his condition    New Onset acute systolic CHF /Post MI ischemic CMP   - As evidenced on echocardiogram.  Cardiology recommended Lasix during hospitalization. Patient euvolemic at discharge. Cardiology added GDMT with beta-blocker, ARNI   - Advised Close F/up with Cardiology at [facility]   - CHF RN consulted   DAILY SELF CARE WITH HEART FAILURE:  ~ Weigh yourself every morning and call your doctor if you gain 3 lb gain in a day -or- 5 lb gain in a week. ~Follow a No Added Salt/Low Sodium diet of 3000 mg sodium daily  ~Follow a Fluid Limitation of 2 liters (64 ounces daily) on consistent basis  ~Call your doctor if you notice : worsening shortness of breath especially with usual activities or when lying down flat, increased swelling, decreased urination, weight gain of 3 lb overnight or 5 lb gain in a week, increased coughing, or chest pressure or dizziness upon discharge from hospital.   ~Go to all scheduled follow up appointments after hospital stay  ~Take all prescribed medications.  Do not stop any-without calling your doctor first.  ~Heart Failure Non Urgent Resource phone number [phone]     #New onset atrial fibrillation  - Started around noon  [id number] Score: 2 For history of vascular disease and age. Disclaimer: Risk Score calculation is dependent on accuracy of patient problem list and past encounter diagnosis. - Per cardiology, plan for triple therapy for 1 month, then discontinue aspirin with continuation of P2Y12 + DOAC. - Added PPI to be continued while on triple therapy for 1 month  - Will need 2 week even monitor at discharge  - Follow-up with cardiology as directed    #Hx of BPH s/p prostatectomy  - Stable  - Patient reports that he doesn't take flomax anymore, discontinued at discharge      Physical Exam Performed:   /72   Pulse 78   Temp 97.5 °F (36.4 °C) (Oral)   Resp 20   Ht 6' 1\" (1.854 m)   Wt 208 lb 4.8 oz (94.5 kg)   SpO2 96%   BMI 27.48 kg/m²       General appearance: No apparent distress, appears stated age and cooperative. HEENT: Pupils equal, round, and reactive to light. Conjunctivae/corneas clear. Neck: Supple, with full range of motion. No jugular venous distention. Trachea midline. Respiratory:  Normal respiratory effort. Clear to auscultation, bilaterally without Rales/Wheezes/Rhonchi. Cardiovascular: Regular rate and rhythm with normal S1/S2 without murmurs, rubs or gallops. Abdomen: Soft, non-tender, non-distended with normal bowel sounds. Musculoskeletal: No clubbing, cyanosis or edema bilaterally. Full range of motion without deformity. Skin: Skin color, texture, turgor normal.  No rashes or lesions. Neurologic:  Neurovascularly intact without any focal sensory/motor deficits. Cranial nerves: II-XII intact, grossly non-focal.  Psychiatric: Alert and oriented, thought content appropriate, normal insight  Capillary Refill: Brisk,3 seconds, normal   Peripheral Pulses: +2 palpable, equal bilaterally       Labs:  For convenience and continuity at follow-up the following most recent labs are provided:      CBC:    Lab Results Component Value Date    WBC 11.2 04/13/2022    HGB 17.3 04/13/2022    HCT 51.7 04/13/2022     04/13/2022       Renal:    Lab Results   Component Value Date     04/13/2022    K 3.9 04/13/2022     04/13/2022    CO2 18 04/13/2022    BUN 22 04/13/2022    CREATININE 0.9 04/13/2022    CALCIUM 9.4 04/13/2022         Significant Diagnostic Studies    Radiology:   XR CHEST PORTABLE   Final Result   Small left basilar pneumonia                Consults:     IP CONSULT TO HOSPITALIST    Disposition:  Home    Condition at Discharge: Stable    Discharge Instructions/Follow-up:  Follow-up with PCP in 1 week. Follow-up with PCP in 1 week. Follow-up with cardiology as discussed with them. Code Status:  Full Code     Activity: activity as tolerated    Diet: cardiac diet      Discharge Medications:   Discharge Medication List as of 4/13/2022  3:32 PM             Details   aspirin 81 MG chewable tablet Take 1 tablet by mouth daily, Disp-30 tablet, R-0Normal      atorvastatin (LIPITOR) 80 MG tablet Take 1 tablet by mouth nightly, Disp-30 tablet, R-3Normal      metoprolol succinate (TOPROL XL) 25 MG extended release tablet Take 1 tablet by mouth in the morning and at bedtime, Disp-30 tablet, R-3Normal      sacubitril-valsartan (ENTRESTO) 24-26 MG per tablet Take 1 tablet by mouth 2 times daily, Disp-60 tablet, R-3Normal      ticagrelor (BRILINTA) 90 MG TABS tablet Take 1 tablet by mouth 2 times daily, Disp-60 tablet, R-3Normal      pantoprazole (PROTONIX) 40 MG tablet Take 1 tablet by mouth every morning (before breakfast), Disp-30 tablet, R-0Normal      apixaban (ELIQUIS) 5 MG TABS tablet Take 1 tablet by mouth 2 times daily, Disp-60 tablet, R-3Normal             Time Spent on discharge is more than 45 minutes in the examination, evaluation, counseling and review of medications and discharge plan.       Signed:    Alie Galvez MD   PGY-1   4/13/2022     Thank you Maria Luz Mills MD for the opportunity to be involved in this patient's care. If you have any questions or concerns please feel free to contact me at [phone]. Addendum to Resident discharge summary note:  Pt seen,examined and evaluated with resident and discussed regarding POC . I have reviewed the current history, physical findings, labs and assessment and plan, edited the note where appropriate and agree with note as documented by resident DO ( Dr. Digna Godwin)    patient seen and evaluated on the day of discharge. Patient informed about following up with appointments. Patient verbalized understanding for follow-up appointments. The patient and / or the family were informed of the results of tests, a time was given to answer questions, a plan was proposed and they agreed with plan. Medical reconciliation performed. Patient discharged stable condition.     Sheryle Dell, MD  Hospitalist Physician

## 2022-04-13 NOTE — PROGRESS NOTES
DC instructions gone over with pt and his wife and all questions answered. He is going to dc home no needs with an event monitor, he is going to pickup an eliquis prescription here from outpatient pharmacy and the rest he will get from [facility].

## 2022-04-13 NOTE — TELEPHONE ENCOUNTER
Monitor company [employer]  Length of monitor 14 days  Monitor ordered by Raul Jesus MD  Patch ID [id number]  Device number [id number]  Nurse will apply at the time of discharge.

## 2022-04-13 NOTE — PROGRESS NOTES
CARDIOLOGY PROGRESS NOTE      Patient Name: Nakia Bauman  Date of admission: 4/10/2022 11:57 AM  Admission Dx: STEMI (ST elevation myocardial infarction) ([facility] [address] [I21.3]  Acute anterior myocardial infarction ([facility] [address] [I21.09]  Reason for Consult:  STEMI  Requesting Physician: Bobby Hendrickson MD  Primary Care physician: Starr Franco MD    Subjective:     Nakia Bauman is a 80 y.o. patient with prior medical history notable for only BPH and prior smoking history, who presented to the hospital with complaints of chest pain beginning at 10:30 AM day of admission. Identified is anterolateral STEMI and taken to Cath Lab for study. Noted new onset AF 4/12, tachycardic at times up to 110-115 bpm, mildly symptomatic. BB inc to BID doing. BP's soft. Today, he notes getting around room with no LH/dizziness/chest pain. No dyspnea with minimal exertion he's performing. No swelling. No groin site issues. No palpitations with activity limiting him. Home Medications:  Were reviewed and are listed in nursing record and/or below  Prior to Admission medications    Medication Sig Start Date End Date Taking?  Authorizing Provider   tamsulosin (FLOMAX) 0.4 MG capsule Take 0.4 mg by mouth daily  7/16/18   Historical Provider, MD        CURRENT Medications:  metoprolol succinate (TOPROL XL) extended release tablet 25 mg, BID  apixaban (ELIQUIS) tablet 5 mg, BID  aspirin chewable tablet 81 mg, Daily  sacubitril-valsartan (ENTRESTO) 24-26 MG per tablet 1 tablet, BID  sodium chloride flush 0.9 % injection 5-40 mL, 2 times per day  sodium chloride flush 0.9 % injection 5-40 mL, PRN  0.9 % sodium chloride infusion, PRN  acetaminophen (TYLENOL) tablet 650 mg, Q4H PRN  ondansetron (ZOFRAN) injection 4 mg, Q6H PRN  atorvastatin (LIPITOR) tablet 80 mg, Nightly  ticagrelor (BRILINTA) tablet 90 mg, BID  morphine (PF) injection 2 mg, Q3H PRN  ALPRAZolam (XANAX) tablet 0.25 mg, BID PRN        Allergies:  Pcn [penicillins]     Review of Systems:   A 14 point review of symptoms completed. Pertinent positives identified in the HPI, all other review of symptoms negative. Objective:     Vitals:    04/12/22 1511 04/12/22 2318 04/12/22 2323 04/13/22 0730   BP: (!) 95/58 98/68 98/68 116/72   Pulse: 97 86 86 81   Resp: 16 20  20   Temp: 98.1 °F (36.7 °C) 98 °F (36.7 °C)  97.6 °F (36.4 °C)   TempSrc: Oral Oral  Axillary   SpO2: 95% 93%  95%   Weight:    208 lb 4.8 oz (94.5 kg)   Height:          Weight: 208 lb 4.8 oz (94.5 kg)       PHYSICAL EXAM:    General:  Alert, cooperative, no distress, appears stated age   Head:  Normocephalic, atraumatic   Eyes:  Conjunctiva/corneas clear, anicteric sclerae    Nose: Nares normal, no drainage or sinus tenderness   Throat: No abnormalities of the lips, oral mucosa or tongue. Neck: Trachea midline. Neck supple with no lymphadenopathy, thyroid not enlarged, symmetric, no tenderness/mass/nodules, no Jugular venous pressure elevation    Lungs:   Clear to auscultation bilaterally, no wheezes, no rales, no respiratory distress   Chest Wall:  No deformity or tenderness to palpation   Heart:   Irregular, rate controlled, variable S1, normal S2, no murmur, no rub, no S3/S4, PMI non-palpable. Abdomen:   Central adiposity, Soft, non-tender, with normoactive bowel sounds. No masses, no hepatosplenomegaly   Extremities: No cyanosis, clubbing or pitting edema. Vascular: 2+ radial, R femoral site c/d/i with no audible bruit, 2+ dorsalis pedis and posterior tibial pulses bilaterally. Brisk carotid upstrokes without carotid bruit. Skin: Skin color, texture, turgor are normal with no rashes or ulceration. Pysch: Euthymic mood, appropriate affect   Neurologic: Oriented to person, place and time. No slurred speech or facial asymmetry. No motor or sensory deficits on gross examination.          Labs:   CBC:   Lab Results   Component Value Date    WBC 8.9 04/11/2022    RBC 4.36 04/11/2022 HGB 14.5 04/11/2022    HCT 43.9 04/11/2022    .9 04/11/2022    RDW 14.4 04/11/2022     04/11/2022     CMP:  Lab Results   Component Value Date     04/11/2022    K 4.1 04/11/2022     04/11/2022    CO2 21 04/11/2022    BUN 7 04/11/2022    CREATININE 0.8 04/11/2022    GFRAA >60 04/11/2022    AGRATIO 1.6 04/10/2022    LABGLOM >60 04/11/2022    GLUCOSE 125 04/11/2022    PROT 7.7 04/10/2022    CALCIUM 8.7 04/11/2022    BILITOT 0.4 04/10/2022    ALKPHOS 61 04/10/2022    AST 68 04/10/2022    ALT 27 04/10/2022     PT/INR:  No results found for: PTINR  HgBA1c:  Lab Results   Component Value Date    LABA1C 5.6 04/11/2022     Lab Results   Component Value Date    TROPONINI 0.37 (H) 04/10/2022         Interval Testing/Data:     EKG yesterday with AF, heart rate 101 bpm, evolutionary changes of anterior infarct, left anterior fascicular block    Telemetry personally reviewed: AF, 80's. TTE 4/11  Conclusions      Summary   The left ventricular systolic function is severely reduced with an ejection   fraction of 40-45 %. There is severe hypokinesis of the anterior and apical walls consistent with   infarction within the territory of the left anterior descending artery. Definity contrast administered with no evidence of left ventricular mass or   thrombus noted. The aortic valve appears trileaflet but sclerotic. Mild mitral regurgitation. Cardiac catheterization 4/10  Right coronary 75% stenosis proximal vessel  90% hazy, thrombotic stenosis proximal portion mid left anterior descending  Large first diagonal, 90% stenosis, large clot burden  Unable to place [place] for aspiration thrombectomy, abandoned, with subsequent PCI LAD, D1.   3.0 x 12 mm Resolute [place] drug-eluting stent to ostial diagonal.   3.5 mm x 22 mm Resolute [place] drug-eluting stent into the  LAD subsequently. It was an effective T-stent of the LAD and the diagonal branch.    The LAD had RUDY-3 blood flow, but the diagonal branch had RUDY-1 flow refractory to multiple medications and could not pass balloon back into diagonal.   Technically difficult PCI procedure. CONCLUSIONS:  1. Successful T-stenting of diagonal LAD. There was a 3.5 x 22 mm  length Resolute [place] drug-eluting stent in the LAD and 3.0 x 12 mm  Resolute [place] drug-eluting stent in the diagonal branch. RUDY-3 blood  flow in the LAD and RUDY-2 flow in the diagonal branch. LV ejection  fraction estimated at 40% on left ventriculography with EDP of 14 mmHg. 2.  Right coronary artery has 75% to 80% proximal coronary stenosis,  dominant right coronary artery and minor irregularities elsewhere. LV  EF 40%, anterolateral wall hypokinesia, significant LVEDP 14.  3.  Angio-Seal successful right femoral arteriotomy.     PLAN:  1. Hydration. 2.  Bedrest.  3.  Integrilin 8 hours. Brilinta was given 180 mg on the table. Nitroglycerin drip 40 mcg per minute. Impression and Plan      1. CAD with anterolateral STEMI, status post PCI LAD/Diagonal  2. Ischemic CM with LVEF 40-45%  3. Atrial fibrillation, new onset  4. Hyperlipidemia, , triglycerides 113   5. Prior tobacco use, now quit    [id number] Score for Atrial Fibrillation Stroke Risk   Risk   Factors  Component Value   C CHF No 0   H HTN No 0   A2 Age >= 76 No,  (70 y.o.) 0   D DM No 0   S2 Prior Stroke/TIA No 0   V Vascular Disease Yes 1   A Age 74-69 Yes,  (70 y.o.) 1   Sc Sex male 0    [id number]  Score  2   Score last updated 4/12/22 0:02 PM EDT    Click here for a link to the UpToDate guideline \"Atrial Fibrillation: Anticoagulation therapy to prevent embolization    Disclaimer: Risk Score calculation is dependent on accuracy of patient problem list and past encounter diagnosis.     Patient Active Problem List   Diagnosis    Acute ST elevation myocardial infarction (STEMI) due to occlusion of left anterior descending (LAD) coronary artery ([place] [address]    [facility]I (ST elevation myocardial infarction) ([facility] [address]    Acute anterior myocardial infarction ([facility] [address]    Acute systolic heart failure (HCC)    Ischemic cardiomyopathy    Mixed hyperlipidemia    Atrial fibrillation ([facility] [address]       PLAN:  1. Continue baby aspirin daily, Brilinta twice daily. New diagnosis AF, CHADS2. Warrants anti-coagulation. Plan for triple therapy x 1 month with aspirin/brilinta/eliquis +PPI for gastric protection. DC aspirin at 1 month and continue brilinta/eliquis combination therapy x 1 year from STEMI with re-evaluation of therapies at that time. 2.  Continue high intensity Lipitor  3. Metoprolol XL 25 mg BID  4. Add low dose entresto. 2 week monitor at discharge. If remains in atrial fibrillation at follow up we will plan OP DCCV. Follow up is 4/20 with APNP. Follow up with me in roughly 6 weeks. Ok for discharge from my standpoint today. I will address the patient's cardiac risk factors and adjusted pharmacologic treatment as needed. In addition, I have reinforced the need for patient directed risk factor modification. All questions and concerns were addressed to the patient/family. Alternatives to my treatment were discussed. Thank you for allowing us to participate in the care of Evelin Flaherty. Please call me with any questions [phone].     Armond Rios MD, [address] [facility]  [phone] [facility]  [phone] [facility]  4/13/2022 9:17 AM

## 2022-04-14 NOTE — TELEPHONE ENCOUNTER
Noted finding of AF by monitor. This is known. May turn off AF detection but keep high rate detection?  Notify MD for rates > 130bpm?     ALIYA

## 2022-04-20 ENCOUNTER — OFFICE VISIT (OUTPATIENT)
Dept: CARDIOLOGY CLINIC | Age: 72
End: 2022-04-20
Payer: MEDICARE

## 2022-04-20 VITALS
BODY MASS INDEX: 27.43 KG/M2 | HEART RATE: 75 BPM | WEIGHT: 207 LBS | HEIGHT: 73 IN | OXYGEN SATURATION: 97 % | SYSTOLIC BLOOD PRESSURE: 118 MMHG | DIASTOLIC BLOOD PRESSURE: 56 MMHG

## 2022-04-20 DIAGNOSIS — I21.3 ST ELEVATION MYOCARDIAL INFARCTION (STEMI), SUBSEQUENT EPISODE OF CARE (HCC): Primary | ICD-10-CM

## 2022-04-20 DIAGNOSIS — I25.10 CORONARY ARTERY DISEASE INVOLVING NATIVE CORONARY ARTERY OF NATIVE HEART WITHOUT ANGINA PECTORIS: ICD-10-CM

## 2022-04-20 DIAGNOSIS — I25.5 ISCHEMIC CARDIOMYOPATHY: ICD-10-CM

## 2022-04-20 PROCEDURE — 99215 OFFICE O/P EST HI 40 MIN: CPT | Performed by: NURSE PRACTITIONER

## 2022-04-20 PROCEDURE — 93000 ELECTROCARDIOGRAM COMPLETE: CPT | Performed by: NURSE PRACTITIONER

## 2022-04-20 RX ORDER — NITROGLYCERIN 0.4 MG/1
0.4 TABLET SUBLINGUAL EVERY 5 MIN PRN
COMMUNITY
Start: 2022-04-19

## 2022-04-20 RX ORDER — METOPROLOL SUCCINATE 25 MG/1
25 TABLET, EXTENDED RELEASE ORAL 2 TIMES DAILY
Qty: 60 TABLET | Refills: 5 | Status: SHIPPED | OUTPATIENT
Start: 2022-04-20 | End: 2022-05-23

## 2022-04-20 ASSESSMENT — ENCOUNTER SYMPTOMS
GASTROINTESTINAL NEGATIVE: 1
SHORTNESS OF BREATH: 1

## 2022-04-20 NOTE — PATIENT INSTRUCTIONS
Everything looks great today, good job!   Continue current medications  Cardiac rehab referral  Ok to slowly increase activity  Check blood work in the next few days  Follow up as planned with Dr. Heena Hinton

## 2022-04-20 NOTE — PROGRESS NOTES
[address]   Cardiology Note              Date:  April 20, 2022  Patientname: Alanna Nicholson  YOB: 1950    Primary Care physician: Camden David MD    HISTORY OF PRESENT ILLNESS: Alanna Nicholson is a 80 y.o. male with a history of CAD, AF, cardiomyopathy, HLD. He presented 4/10/2022 for chest pain and found to have anterolateral STEMI. LHC showed LAD/Diag lesion with thrombus each treated with HOLDEN. Echo showed EF 40-45%. He developed AF post PCI. Started on eliquis and discharged with monitor. Today he presents for hospital follow up for STEMI/CAD s/p PCI. EKG today shows sinus rhythm rate 62. He has felt fatigued since discharge. He also had chest soreness at discharge that is now improving. He has no chest pain. He has occasional shortness of breath. He has no syncope, edema, dizziness or lightheadedness. He is very active at home typically. Office weight today 4/20/2022: 207 lbs  Discharge weight 4/13/2022: 208 lbs    Past Medical History:   has a past medical history of BPH (benign prostatic hyperplasia). Past Surgical History:   has a past surgical history that includes hernia repair. Home Medications:    Prior to Admission medications    Medication Sig Start Date End Date Taking?  Authorizing Provider   nitroGLYCERIN (NITROSTAT) 0.4 MG SL tablet Place 0.4 mg under the tongue every 5 minutes as needed 4/19/22  Yes Historical Provider, MD   aspirin 81 MG chewable tablet Take 1 tablet by mouth daily 4/14/22  Yes Flavio De La Garza MD   atorvastatin (LIPITOR) 80 MG tablet Take 1 tablet by mouth nightly 4/13/22  Yes Flavio De La Garza MD   metoprolol succinate (TOPROL XL) 25 MG extended release tablet Take 1 tablet by mouth in the morning and at bedtime 4/13/22  Yes Flavio De La Garza MD   sacubitril-valsartan (ENTRESTO) 24-26 MG per tablet Take 1 tablet by mouth 2 times daily 4/13/22  Yes Flavio De La Garza MD   ticagrelor (BRILINTA) 90 MG TABS tablet Take 1 tablet by mouth 2 times daily 4/13/22  Yes Lizy Farnsworth MD   pantoprazole (PROTONIX) 40 MG tablet Take 1 tablet by mouth every morning (before breakfast) 4/14/22  Yes Lizy Farnsworth MD   apixaban (ELIQUIS) 5 MG TABS tablet Take 1 tablet by mouth 2 times daily 4/13/22  Yes Loman Habermann, MD     Allergies:  Patient has no known allergies. Social History:   reports that he quit smoking about 16 years ago. He has a 10.00 pack-year smoking history. He has never used smokeless tobacco. He reports previous alcohol use. He reports current drug use. Drug: Marijuana Mercedes Dinning). Family History: family history is not on file. Review of Systems   Review of Systems   Constitutional: Positive for fatigue. Respiratory: Positive for shortness of breath. Cardiovascular: Negative. Gastrointestinal: Negative. Neurological: Negative. OBJECTIVE:    Vital signs:    BP (!) 118/56   Pulse 75   Ht 6' 1\" (1.854 m)   Wt 207 lb (93.9 kg)   SpO2 97%   BMI 27.31 kg/m²      Physical Exam:  Constitutional:  Comfortable and alert, NAD, appears stated age  Eyes: PERRL, sclera nonicteric  Neck:  Supple, no masses, no thyroidmegaly, no JVD  Skin:  Warm and dry; no rash or lesions  Heart: Regular, normal apex, S1 and S2 normal, no M/G/R  Lungs:  Normal respiratory effort; clear; no wheezing/rhonchi/rales  Abdomen: soft, non tender, + bowel sounds  Extremities:  No edema or cyanosis; no clubbing  Neuro: alert and oriented, moves legs and arms equally, normal mood and affect  Right femoral site soft, no hematoma, 2+ R femoral pulse, 2+ R DP/PT pulse    Data Reviewed:      EKG 4/20/2022:  Sinus rhythm    Echo 4/2022: The left ventricular systolic function is severely reduced with an ejection   fraction of 40-45 %. There is severe hypokinesis of the anterior and apical walls consistent with   infarction within the territory of the left anterior descending artery.    Definity contrast administered with no evidence of left ventricular mass or   thrombus noted. The aortic valve appears trileaflet but sclerotic. Mild mitral regurgitation. Coronary angiogram:  PROCEDURES:  Cardiac catheterization, percutaneous coronary  intervention. INDICATIONS FOR THE PROCEDURE:  The patient presents with chest pain  that started about 10:30 a.m., EMS triaged the patient and found on EKG  that he had anterior ST elevation MI. Started having chest pain about  1030 today. He was whisked pretty quickly to the cardiac  catheterization laboratory at [facility], where I met him  in the cath lab, examined him and proceeded with cardiac catheterization  and intervention. The start time of the procedure was 1210 and end time was 1330. The  patient was given 5 mg of Versed and 75 mcg of fentanyl by my order by  the qualified independent observer, Ade Weeks RN. The entire cardiac team  and myself monitored his cardiopulmonary status during the entire  procedure. DESCRIPTION OF PROCEDURE:  A 6-Bengali sheath was placed in a retrograde  manner into the right femoral artery over guidewire. It was aspirated  and flushed. JR4 catheter was used to engage the right coronary artery. Injection shows dominant right coronary artery with 75% stenosis in the  right coronary artery proximally. The right PDA was patent and the  right posterolateral branch was patent. No collaterals to the LAD were  noted. A JR4 guide was used to engage the left main coronary artery,  6-Bengali. Injection reveals normal left main coronary artery. The  early mid left anterior descending coronary artery has a hazy thrombotic  90 plus percent stenosis. There was a diagonal branch that was very  large, that was involved in the stenosis that also has thrombus burden  heavy and 90% narrowing. I placed BMW wire in the LAD, heavy support  wire in the diagonal.  I tried to place with the [employer] CAT RX  extraction catheter into the LAD and I could not.   It would not pass.  I  have to abandon this. I then went with the balloon angioplasty of the  LAD with two wires in place. I used a 2.5 x 20 mm balloon. This was  inflated 14 atmospheres x20 seconds. There was nearly RUDY-3 blood in  the LAD. The diagonal branch had significant disease still. I felt  that I could pass a stent into the ostium of the 90% diagonal branch  with thrombus and I felt that once I stented the LAD, I would not be  able to pass the stent there and it was a large artery. I tried to  protect it with a 3.0 x 12 mm Resolute [place] drug-eluting stent placed in  the ostium of the diagonal branch. It was inflated to 12 atmospheres  nominal pressure. There was RUDY-3 blood flow with less than 5%  residual stenosis. I then turned my attention to the LAD. I could not  pass the stent into the LAD to do a T-stent. I had to balloon the LAD  with a 3.0 x 20 balloon. I went to 14 atmospheres and then I was able  to pass the 3.5 mm x 22 mm Resolute [place] drug-eluting stent into the  LAD. It was an effective T-stent of the LAD and the diagonal branch. The LAD had RUDY-3 blood flow, but the diagonal branch had RUDY-1 flow. I attempted to place a balloon back into the diagonal branch and I could  not, after re-wiring and so what I did was I gave nitroglycerin 400 mcg  and then I gave adenosine 50 and then 100 mcg into the guide and then I  gave another 500 mcg of nitroglycerin into the guide and there was  RUDY-2 flow in the diagonal branch. It was fairly good-sized branch,  but there was RUDY-1 flow during the initial injections. I had to  conclude the interventional procedure at that juncture. CONCLUSIONS:  1. Successful T-stenting of diagonal LAD. There was a 3.5 x 22 mm  length Resolute [place] drug-eluting stent in the LAD and 3.0 x 12 mm  Resolute [place] drug-eluting stent in the diagonal branch. RUDY-3 blood  flow in the LAD and RUDY-2 flow in the diagonal branch.   LV ejection  fraction estimated at 40% on left ventriculography with EDP of 14 mmHg. 2.  Right coronary artery has 75% to 80% proximal coronary stenosis,  dominant right coronary artery and minor irregularities elsewhere. LV  EF 40%, anterolateral wall hypokinesia, significant LVEDP 14.  3.  Angio-Seal successful right femoral arteriotomy. PLAN:  1. Hydration. 2.  Bedrest.  3.  Integrilin 8 hours. Brilinta was given 180 mg on the table. Nitroglycerin drip 40 mcg per minute. Cardiology Labs Reviewed:   CBC: No results for input(s): WBC, HGB, HCT, PLT in the last 72 hours. BMP:No results for input(s): NA, K, CO2, BUN, CREATININE, LABGLOM, GLUCOSE in the last 72 hours. PT/INR: No results for input(s): PROTIME, INR in the last 72 hours. APTT:No results for input(s): APTT in the last 72 hours. FASTING LIPID PANEL:  Lab Results   Component Value Date    HDL 52 04/10/2022    LDLCALC 153 04/10/2022    TRIG 113 04/10/2022     LIVER PROFILE:No results for input(s): AST, ALT, ALB in the last 72 hours. BNP:   Lab Results   Component Value Date    PROBNP 238 04/10/2022     Reviewed all labs and imaging today    Assessment:   CAD: s/p HOLDEN LAD and HOLDEN Diag in the setting of STEMI 4/10/2022   - 75-80% RCA disease noted  Ischemic cardiomyopathy: EF 40-45% on echo 4/2022  Paroxysmal atrial fibrillation: now stable, in sinus   - [id number] score 3 (age, MI, LV dysfunction)  HLD: uncontrolled, , statin and recheck    Plan:   1. Cardiac rehab referral  2. Continue aspirin, eliquis, brilinta; after 5/10/2022, plan to stop aspirin to limit triple therapy; in the event eliquis stopped, aspirin should be resumed  3. Await cardiac monitor results; if no recurrence on AF and felt related to MI, may consider stopping eliquis  4. Continue toprol, entresto, statin, protonix (while on triple therapy)  5. Consider evaluation for RCA disease in follow up  6. BMP in 1 week  7. Ok to increase activity  8.  Check BP at home and call the office if consistently out of goal range  9. Follow up as planned with Dr. Saúl Morales 5/23/2022    More than 55 minutes of time was spent in direct patient contact during this visit, more than 50% of which was spent educating regarding CAD, procedural details and lifestyle modifications.     Chuyita Holcomb, KAIT-CNP  [address]  [phone]

## 2022-04-27 ENCOUNTER — HOSPITAL ENCOUNTER (OUTPATIENT)
Age: 72
Discharge: HOME | End: 2022-04-27
Payer: MEDICARE

## 2022-04-27 DIAGNOSIS — I21.3 ST ELEVATION MYOCARDIAL INFARCTION (STEMI), SUBSEQUENT EPISODE OF CARE (HCC): ICD-10-CM

## 2022-04-27 LAB
ANION GAP SERPL CALCULATED.3IONS-SCNC: 13 MMOL/L (ref 3–16)
BUN BLDV-MCNC: 12 MG/DL (ref 7–20)
CALCIUM SERPL-MCNC: 9.3 MG/DL (ref 8.3–10.6)
CHLORIDE BLD-SCNC: 103 MMOL/L (ref 99–110)
CO2: 22 MMOL/L (ref 21–32)
CREAT SERPL-MCNC: 1 MG/DL (ref 0.8–1.3)
GFR AFRICAN AMERICAN: >60
GFR NON-AFRICAN AMERICAN: >60
GLUCOSE BLD-MCNC: 89 MG/DL (ref 70–99)
POTASSIUM SERPL-SCNC: 4.8 MMOL/L (ref 3.5–5.1)
SODIUM BLD-SCNC: 138 MMOL/L (ref 136–145)

## 2022-04-27 PROCEDURE — 36415 COLL VENOUS BLD VENIPUNCTURE: CPT

## 2022-04-27 PROCEDURE — 80048 BASIC METABOLIC PNL TOTAL CA: CPT

## 2022-04-28 DIAGNOSIS — Z79.899 MEDICATION MANAGEMENT: Primary | ICD-10-CM

## 2022-05-09 PROCEDURE — 93272 ECG/REVIEW INTERPRET ONLY: CPT | Performed by: INTERNAL MEDICINE

## 2022-05-11 ENCOUNTER — HOSPITAL ENCOUNTER (OUTPATIENT)
Age: 72
Discharge: HOME | End: 2022-05-11
Payer: MEDICARE

## 2022-05-11 ENCOUNTER — HOSPITAL ENCOUNTER (OUTPATIENT)
Dept: CARDIAC REHAB | Age: 72
Setting detail: THERAPIES SERIES
Discharge: HOME | End: 2022-05-11
Payer: MEDICARE

## 2022-05-11 DIAGNOSIS — Z79.899 MEDICATION MANAGEMENT: ICD-10-CM

## 2022-05-11 LAB
ANION GAP SERPL CALCULATED.3IONS-SCNC: 12 MMOL/L (ref 3–16)
BUN BLDV-MCNC: 16 MG/DL (ref 7–20)
CALCIUM SERPL-MCNC: 9.7 MG/DL (ref 8.3–10.6)
CHLORIDE BLD-SCNC: 104 MMOL/L (ref 99–110)
CO2: 24 MMOL/L (ref 21–32)
CREAT SERPL-MCNC: 1.1 MG/DL (ref 0.8–1.3)
GFR AFRICAN AMERICAN: >60
GFR NON-AFRICAN AMERICAN: >60
GLUCOSE BLD-MCNC: 111 MG/DL (ref 70–99)
POTASSIUM SERPL-SCNC: 4.4 MMOL/L (ref 3.5–5.1)
SODIUM BLD-SCNC: 140 MMOL/L (ref 136–145)

## 2022-05-11 PROCEDURE — 36415 COLL VENOUS BLD VENIPUNCTURE: CPT

## 2022-05-11 PROCEDURE — 80048 BASIC METABOLIC PNL TOTAL CA: CPT

## 2022-05-12 RX ORDER — PANTOPRAZOLE SODIUM 40 MG/1
40 TABLET, DELAYED RELEASE ORAL
Qty: 90 TABLET | Refills: 3 | OUTPATIENT
Start: 2022-05-12

## 2022-05-12 RX ORDER — ASPIRIN 81 MG/1
81 TABLET, CHEWABLE ORAL DAILY
Qty: 90 TABLET | Refills: 3 | Status: SHIPPED | OUTPATIENT
Start: 2022-05-12 | End: 2022-05-13 | Stop reason: ALTCHOICE

## 2022-05-12 NOTE — TELEPHONE ENCOUNTER
Pts wife is requesting refills of  aspirin 81 MG chewable tablet    pantoprazole (PROTONIX) 40 MG tablet     Preferred pharmacy is [facility] [address]. Last ov 04/20/2022 isael. Upcoming ov 05/23/2022 stephen. Pt will be out of medication by Friday. Pts wife would like a confirmation call when medication has been sent to the pharmacy.

## 2022-05-13 RX ORDER — PANTOPRAZOLE SODIUM 40 MG/1
40 TABLET, DELAYED RELEASE ORAL
Qty: 90 TABLET | OUTPATIENT
Start: 2022-05-13

## 2022-05-13 NOTE — TELEPHONE ENCOUNTER
Do you want patient to continue on Protonix-see your note 4/13/2022? If so he needs refill sent to [facility]. I have it pended.

## 2022-05-16 ENCOUNTER — HOSPITAL ENCOUNTER (OUTPATIENT)
Dept: CARDIAC REHAB | Age: 72
Setting detail: THERAPIES SERIES
Discharge: HOME | End: 2022-05-16
Payer: MEDICARE

## 2022-05-16 PROCEDURE — 93798 PHYS/QHP OP CAR RHAB W/ECG: CPT

## 2022-05-18 ENCOUNTER — HOSPITAL ENCOUNTER (OUTPATIENT)
Dept: CARDIAC REHAB | Age: 72
Setting detail: THERAPIES SERIES
Discharge: HOME | End: 2022-05-18
Payer: MEDICARE

## 2022-05-18 DIAGNOSIS — I21.3 ST ELEVATION MYOCARDIAL INFARCTION (STEMI), UNSPECIFIED ARTERY (HCC): ICD-10-CM

## 2022-05-18 DIAGNOSIS — I21.02 ACUTE ST ELEVATION MYOCARDIAL INFARCTION (STEMI) DUE TO OCCLUSION OF LEFT ANTERIOR DESCENDING (LAD) CORONARY ARTERY (HCC): ICD-10-CM

## 2022-05-18 DIAGNOSIS — Z95.5 STENTED CORONARY ARTERY: ICD-10-CM

## 2022-05-18 DIAGNOSIS — I48.91 ATRIAL FIBRILLATION, UNSPECIFIED TYPE (HCC): ICD-10-CM

## 2022-05-18 DIAGNOSIS — I21.09 ACUTE ANTERIOR MYOCARDIAL INFARCTION (HCC): ICD-10-CM

## 2022-05-18 PROCEDURE — 93798 PHYS/QHP OP CAR RHAB W/ECG: CPT

## 2022-05-19 ENCOUNTER — TELEPHONE (OUTPATIENT)
Dept: CARDIOLOGY CLINIC | Age: 72
End: 2022-05-19

## 2022-05-19 NOTE — TELEPHONE ENCOUNTER
----- Message from Amrita Sprague MD sent at 5/19/2022  1:32 PM EDT -----  Please let Jelly Porter know I reviewed his cardiac monitor study. This showed no recurrence of true atrial fibrillation. His heart rate is predominantly in the high 50s. This is at goal.  Continue current medication regimen and reinforce triple therapy planned only x1 month with discontinuation of aspirin following this. Lets ensure he knows this.   Follow-up as planned

## 2022-05-19 NOTE — TELEPHONE ENCOUNTER
Spoke with Meliza Lo relayed message per ALIYA. Pt v/u and wanted RJM to know he has started cardiac rehab and has been 3 time. Joann Rizzo- He has appt Monday 5/23 in [facility]. He would like to still have that appt to discuss results and dizziness.

## 2022-05-19 NOTE — PROGRESS NOTES
CARDIOLOGY FOLLOW UP        Patient Name: Marii Bermudez  Primary Care physician: Sara Brooks MD    Reason for Referral/Chief Complaint: Marii Bermudez is a 71 y.o. patient who is here today for hospital follow up for STEMI s/p PCI. PAF noted on monitor. History of Present Illness:     Bishop Guerrero is a 71 y.o. patient with prior medical history notable for prior smoking history,  ischemic CM with mild LV dysfunction, coronary artery disease with prior STEMI, paroxysmal atrial fibrillation, SVT and hyperlipidemia, presented today for follow-up. The patient originally presented to the hospital 4/10/22 with complaints of chest pain beginning at 10:30 AM the day of admission. Identified is anterolateral STEMI and taken to Cath Lab for study. LHC showed LAD/Diag lesion with thrombus each treated with HOLDEN. Echo showed EF 40-45%. He developed AF post PCI. Started on eliquis and discharged with monitor for SVT noted during admission. Two week monitor 4-13-22 noted AF/AFL 0.67%; 18 episodes of SVT longest one was 4/18/22 for 14 beats. Today, patient and wife report that he is doing well. He notes that he has some lightheadedness with quick changes in position. He has noticed this during cardiac rehab as well. Notes his heart rate is slow in the mornings even after he is up and about. 40s at times. Recently he went on field trip with [email or web address]. He had done a treadmill that morning for a mile and the walked around [place] about another mile. He did fine but discouraged he could not do more. Little fatigued. No loss of consciousness. Tolerating blood thinner therapy. He does bruise easily. No clinical bleeding however. He has lost 9 lbs since 4/13/22. The patient denies chest pain/pressure/heaviness with exertion. No sublingual nitroglycerin use in the interim. Denies limiting shortness of breath with exertion. Denies palpitations.  Denies paroxysmal nocturnal dyspnea, orthopnea, bendopnea, increasing lower extremity edema or weight gain. Home Medications:  Were reviewed and are listed in nursing record and/or below  Prior to Admission medications    Medication Sig Start Date End Date Taking? Authorizing Provider   nitroGLYCERIN (NITROSTAT) 0.4 MG SL tablet Place 0.4 mg under the tongue every 5 minutes as needed 4/19/22  Yes Historical Provider, MD   metoprolol succinate (TOPROL XL) 25 MG extended release tablet Take 1 tablet by mouth in the morning and at bedtime 4/20/22  Yes KAIT Lucas - CNP   atorvastatin (LIPITOR) 80 MG tablet Take 1 tablet by mouth nightly 4/13/22  Yes Sandie Falk MD   sacubitril-valsartan (ENTRESTO) 24-26 MG per tablet Take 1 tablet by mouth 2 times daily 4/13/22  Yes Sandie Falk MD   ticagrelor (BRILINTA) 90 MG TABS tablet Take 1 tablet by mouth 2 times daily 4/13/22  Yes Sandie Falk MD   apixaban (ELIQUIS) 5 MG TABS tablet Take 1 tablet by mouth 2 times daily 4/13/22  Yes Pam Zeng MD        CURRENT Medications:  No current facility-administered medications for this visit. Allergies:  Patient has no known allergies. Review of Systems:   A 14 point review of symptoms completed. Pertinent positives identified in the HPI, all other review of symptoms negative as below. Objective:     Vitals:    05/23/22 1329   BP: 106/60   Pulse: 59   Temp: 98.3 °F (36.8 °C)   SpO2: 97%    Weight: 199 lb 12.8 oz (90.6 kg)       PHYSICAL EXAM:    General:  Alert, cooperative, no acute distress, appears stated age   Head:  Normocephalic, atraumatic   Eyes:  Conjunctiva/corneas clear, anicteric sclerae    Nose: Nares normal, no drainage or sinus tenderness   Throat: No abnormalities of the lips, oral mucosa or tongue. Neck: Trachea midline.  Neck supple with no lymphadenopathy, thyroid not enlarged, symmetric, no tenderness/mass/nodules    Lungs:   Clear to auscultation bilaterally, no wheezes, no rales, no respiratory distress   Chest Wall:  No deformity or tenderness to palpation   Heart:  Regular rate and rhythm, normal S1, normal S2, no murmur, no rub, no S3/S4, PMI non-displaced. Abdomen:   Soft, non-tender, with normoactive bowel sounds. No masses, no hepatosplenomegaly   Extremities: No cyanosis, clubbing or pitting edema. Vascular: 2+ radial, 2+ posterior tibial pulses bilaterally. Brisk carotid upstrokes without carotid bruit. Skin: Skin color, texture, turgor are normal with no rashes or ulceration. Pysch: Euthymic mood, appropriate affect   Neurologic: Oriented to person, place and time. No slurred speech or facial asymmetry. No motor or sensory deficits on gross examination.          Labs:   CBC:   Lab Results   Component Value Date    WBC 11.2 04/13/2022    RBC 5.25 04/13/2022    HGB 17.3 04/13/2022    HCT 51.7 04/13/2022    MCV 98.5 04/13/2022    RDW 13.8 04/13/2022     04/13/2022     CMP:  Lab Results   Component Value Date     05/11/2022    K 4.4 05/11/2022    K 3.9 04/13/2022     05/11/2022    CO2 24 05/11/2022    BUN 16 05/11/2022    CREATININE 1.1 05/11/2022    GFRAA >60 05/11/2022    AGRATIO 1.6 04/10/2022    LABGLOM >60 05/11/2022    GLUCOSE 111 05/11/2022    PROT 7.7 04/10/2022    CALCIUM 9.7 05/11/2022    BILITOT 0.4 04/10/2022    ALKPHOS 61 04/10/2022    AST 68 04/10/2022    ALT 27 04/10/2022     PT/INR:  No results found for: PTINR  HgBA1c:  Lab Results   Component Value Date    LABA1C 5.6 04/11/2022     Lab Results   Component Value Date    TROPONINI 0.37 (H) 04/10/2022     Lab Results   Component Value Date    CHOL 228 (H) 04/10/2022     Lab Results   Component Value Date    TRIG 113 04/10/2022     Lab Results   Component Value Date    HDL 52 04/10/2022     Lab Results   Component Value Date    LDLCALC 153 (H) 04/10/2022     Lab Results   Component Value Date    LABVLDL 23 04/10/2022     No results found for: CHOLHDLRATIO      Cardiac Data:       Echo:  Summary  The left ventricular systolic function is severely reduced with an ejection fraction of 40-45 %. There is severe hypokinesis of the anterior and apical walls consistent with infarction within the territory of the left anterior descending  artery. Definity contrast administered with no evidence of left ventricular mass or thrombus noted. The aortic valve appears trileaflet but sclerotic. Mild mitral regurgitation. Cardiac catheterization 4/10  Right coronary 75% stenosis proximal vessel  90% hazy, thrombotic stenosis proximal portion mid left anterior descending  Large first diagonal, 90% stenosis, large clot burden  Unable to place Penumbra for aspiration thrombectomy, abandoned, with subsequent PCI LAD, D1.   3.0 x 12 mm Resolute Benjam[place] drug-eluting stent to ostial diagonal.   3.5 mm x 22 mm Resolute Benjam[place] drug-eluting stent into the  LAD subsequently. It was an effective T-stent of the LAD and the diagonal branch. The LAD had RUDY-3 blood flow, but the diagonal branch had RUDY-1 flow refractory to multiple medications and could not pass balloon back into diagonal.   Technically difficult PCI procedure. CONCLUSIONS:  1. Successful T-stenting of diagonal LAD. There was a 3.5 x 22 mm  length Resolute Benjamín drug-eluting stent in the LAD and 3.0 x 12 mm  Resolute [place] drug-eluting stent in the diagonal branch. RUDY-3 blood  flow in the LAD and RUDY-2 flow in the diagonal branch. LV ejection  fraction estimated at 40% on left ventriculography with EDP of 14 mmHg. 2.  Right coronary artery has 75% to 80% proximal coronary stenosis,  dominant right coronary artery and minor irregularities elsewhere. LV  EF 40%, anterolateral wall hypokinesia, significant LVEDP 14.  3.  Angio-Seal successful right femoral arteriotomy. PLAN:  1. Hydration. 2.  Bedrest.  3.  Integrilin 8 hours. Brilinta was given 180 mg on the table. Nitroglycerin drip 40 mcg per minute.     Additional studies:   Two week monitor 4-13-22 noted AF/AFL 0.67%; 18 episodes of SVT longest one was 4/18/22 for 14 beats. Impression and Plan:      CAD with anterolateral STEMI, status post PCI LAD/Diagonal  Ischemic CM with mild LV dysfunction with LVEF 40%  Paroxysmal atrial fibrillation  SVT    Hyperlipidemia  Prior tobacco use, now quit  Lightheadedness    [id number] Score for Atrial Fibrillation Stroke Risk   Risk   Factors  Component Value   C CHF No 0   H HTN No 0   A2 Age >= 76 No,  (76 y.o.) 0   D DM No 0   S2 Prior Stroke/TIA No 0   V Vascular Disease Yes 1   A Age 74-69 Yes,  (76 y.o.) 1   Sc Sex male 0    [id number]  Score  2   Score last updated 5/23/22 1:32 PM EDT    Click here for a link to the UpToDate guideline \"Atrial Fibrillation: Anticoagulation therapy to prevent embolization    Disclaimer: Risk Score calculation is dependent on accuracy of patient problem list and past encounter diagnosis. Patient Active Problem List   Diagnosis    Acute ST elevation myocardial infarction (STEMI) due to occlusion of left anterior descending (LAD) coronary artery ([address]    STEMI (ST elevation myocardial infarction) ([address]    Acute anterior myocardial infarction ([address]    Acute systolic heart failure ([address]    Ischemic cardiomyopathy    Mixed hyperlipidemia    Atrial fibrillation ([address]    Lightheadedness       PLAN:  1. Decrease Entresto 24-26 mg to half twice a day to give him more Blood pressure given fatigue and postural dizziness. 2.  Change metoprolol XL to once daily dosing in the mornings given his slow heart rate lasting throughout the morning time. Recommend that you get fasting lipid panel   3. Repeat lipid profile  4. Continue cardiac rehab  5. We encouraged modest weight loss through implementing appropriate dietary measures; normalize BMI <25 as goal.   6. Repeat limited echocardiogram to reassess function/EF in another 6 weeks    Plan to follow up 6 weeks after echocardiogram and fasting blood work.       This note was scribed in the presence of Valeriy Davalos MD by Josue Cardona RN. The scribes documentation has been prepared under my direction and personally reviewed by me in its entirety. I confirm that the note above accurately reflects all work, treatment, procedures, and medical decision making performed by me. Anisa Crooks MD, personally performed the services described in this documentation as scribed by Josue Cardona RN in my presence, and it is both accurate and complete to the best of our ability. I will address the patient's cardiac risk factors and adjusted pharmacologic treatment as needed. In addition, I have reinforced the need for patient directed risk factor modification. All questions and concerns were addressed to the patient/family. Alternatives to my treatment were discussed. Thank you for allowing us to participate in the care of Shellie Brenner. Please call me with any questions [phone].     Valeriy Davalos MD, [facility] [place] [facility]  Cardiovascular Disease  [address]  [phone] [facility]  [phone] [address] [place] [address]  5/23/2022 1:42 PM

## 2022-05-20 ENCOUNTER — HOSPITAL ENCOUNTER (OUTPATIENT)
Dept: CARDIAC REHAB | Age: 72
Setting detail: THERAPIES SERIES
Discharge: HOME | End: 2022-05-20
Payer: MEDICARE

## 2022-05-20 PROCEDURE — 93798 PHYS/QHP OP CAR RHAB W/ECG: CPT

## 2022-05-23 ENCOUNTER — OFFICE VISIT (OUTPATIENT)
Dept: CARDIOLOGY CLINIC | Age: 72
End: 2022-05-23
Payer: MEDICARE

## 2022-05-23 ENCOUNTER — HOSPITAL ENCOUNTER (OUTPATIENT)
Dept: CARDIAC REHAB | Age: 72
Setting detail: THERAPIES SERIES
Discharge: HOME | End: 2022-05-23
Payer: MEDICARE

## 2022-05-23 VITALS
OXYGEN SATURATION: 97 % | TEMPERATURE: 98.3 F | DIASTOLIC BLOOD PRESSURE: 60 MMHG | WEIGHT: 199.8 LBS | HEART RATE: 59 BPM | BODY MASS INDEX: 26.48 KG/M2 | HEIGHT: 73 IN | SYSTOLIC BLOOD PRESSURE: 106 MMHG

## 2022-05-23 DIAGNOSIS — I21.02 ST ELEVATION MYOCARDIAL INFARCTION INVOLVING LEFT ANTERIOR DESCENDING (LAD) CORONARY ARTERY (HCC): ICD-10-CM

## 2022-05-23 DIAGNOSIS — I25.5 ISCHEMIC CARDIOMYOPATHY: Primary | ICD-10-CM

## 2022-05-23 DIAGNOSIS — I48.0 PAROXYSMAL ATRIAL FIBRILLATION (HCC): ICD-10-CM

## 2022-05-23 DIAGNOSIS — R42 LIGHTHEADEDNESS: ICD-10-CM

## 2022-05-23 DIAGNOSIS — E78.2 MIXED HYPERLIPIDEMIA: ICD-10-CM

## 2022-05-23 PROCEDURE — 99214 OFFICE O/P EST MOD 30 MIN: CPT | Performed by: INTERNAL MEDICINE

## 2022-05-23 PROCEDURE — 93798 PHYS/QHP OP CAR RHAB W/ECG: CPT

## 2022-05-23 RX ORDER — METOPROLOL SUCCINATE 25 MG/1
25 TABLET, EXTENDED RELEASE ORAL DAILY
Qty: 90 TABLET | Refills: 1 | Status: SHIPPED | OUTPATIENT
Start: 2022-05-23 | End: 2022-07-26 | Stop reason: SDUPTHER

## 2022-05-23 NOTE — PATIENT INSTRUCTIONS
PLAN:  1. Decrease Entresto 24-26 mg to half twice a day. 2. Recommend that you get fasting lipid panel  3. Recommend that you change metoprolol to one table in the morning. STOP evening dose. 4. Continue cardiac rehab  We encouraged modest weight loss through implementing appropriate dietary measures as well as initiation of a graded exercise program with the ultimate goal of 150 minutes of aerobic exercise weekly. 5. Repeat echocardiogram to reassess function    Plan to follow up 6 weeks after echocardiogram and fasting blood work. Your provider has ordered testing for further evaluation. An order/prescription has been included in your paper work.  To schedule outpatient testing, contact Central Scheduling by calling [address] [place] [address] ([phone]).

## 2022-05-25 ENCOUNTER — HOSPITAL ENCOUNTER (OUTPATIENT)
Age: 72
Discharge: HOME | End: 2022-05-25
Payer: MEDICARE

## 2022-05-25 ENCOUNTER — HOSPITAL ENCOUNTER (OUTPATIENT)
Dept: CARDIAC REHAB | Age: 72
Setting detail: THERAPIES SERIES
Discharge: HOME | End: 2022-05-25
Payer: MEDICARE

## 2022-05-25 DIAGNOSIS — I25.5 ISCHEMIC CARDIOMYOPATHY: ICD-10-CM

## 2022-05-25 DIAGNOSIS — E78.2 MIXED HYPERLIPIDEMIA: ICD-10-CM

## 2022-05-25 LAB
CHOLESTEROL, TOTAL: 114 MG/DL (ref 0–199)
HDLC SERPL-MCNC: 32 MG/DL (ref 40–60)
LDL CHOLESTEROL CALCULATED: 66 MG/DL
TRIGL SERPL-MCNC: 79 MG/DL (ref 0–150)
VLDLC SERPL CALC-MCNC: 16 MG/DL

## 2022-05-25 PROCEDURE — 93798 PHYS/QHP OP CAR RHAB W/ECG: CPT

## 2022-05-25 PROCEDURE — 80061 LIPID PANEL: CPT

## 2022-05-25 PROCEDURE — 36415 COLL VENOUS BLD VENIPUNCTURE: CPT

## 2022-05-26 ENCOUNTER — TELEPHONE (OUTPATIENT)
Dept: CARDIOLOGY CLINIC | Age: 72
End: 2022-05-26

## 2022-05-26 NOTE — TELEPHONE ENCOUNTER
----- Message from Oly Edwards MD sent at 5/25/2022  9:14 PM EDT -----  Pls let the patient know, lipids look great. No changes.

## 2022-05-27 ENCOUNTER — HOSPITAL ENCOUNTER (OUTPATIENT)
Dept: CARDIAC REHAB | Age: 72
Setting detail: THERAPIES SERIES
Discharge: HOME | End: 2022-05-27
Payer: MEDICARE

## 2022-05-27 PROCEDURE — 93798 PHYS/QHP OP CAR RHAB W/ECG: CPT

## 2022-05-30 ENCOUNTER — APPOINTMENT (OUTPATIENT)
Dept: CARDIAC REHAB | Age: 72
End: 2022-05-30
Payer: MEDICARE

## 2022-06-01 ENCOUNTER — HOSPITAL ENCOUNTER (OUTPATIENT)
Dept: CARDIAC REHAB | Age: 72
Setting detail: THERAPIES SERIES
Discharge: HOME | End: 2022-06-01
Payer: MEDICARE

## 2022-06-01 PROCEDURE — 93798 PHYS/QHP OP CAR RHAB W/ECG: CPT

## 2022-06-03 ENCOUNTER — HOSPITAL ENCOUNTER (OUTPATIENT)
Dept: CARDIAC REHAB | Age: 72
Setting detail: THERAPIES SERIES
Discharge: HOME | End: 2022-06-03
Payer: MEDICARE

## 2022-06-03 PROCEDURE — 93798 PHYS/QHP OP CAR RHAB W/ECG: CPT

## 2022-06-06 ENCOUNTER — HOSPITAL ENCOUNTER (OUTPATIENT)
Dept: CARDIAC REHAB | Age: 72
Setting detail: THERAPIES SERIES
Discharge: HOME | End: 2022-06-06
Payer: MEDICARE

## 2022-06-06 PROCEDURE — 93798 PHYS/QHP OP CAR RHAB W/ECG: CPT

## 2022-06-08 ENCOUNTER — HOSPITAL ENCOUNTER (OUTPATIENT)
Dept: CARDIAC REHAB | Age: 72
Setting detail: THERAPIES SERIES
Discharge: HOME | End: 2022-06-08
Payer: MEDICARE

## 2022-06-08 PROCEDURE — 93798 PHYS/QHP OP CAR RHAB W/ECG: CPT

## 2022-06-10 ENCOUNTER — HOSPITAL ENCOUNTER (OUTPATIENT)
Dept: CARDIAC REHAB | Age: 72
Setting detail: THERAPIES SERIES
Discharge: HOME | End: 2022-06-10
Payer: MEDICARE

## 2022-06-10 PROCEDURE — 93798 PHYS/QHP OP CAR RHAB W/ECG: CPT

## 2022-06-13 ENCOUNTER — HOSPITAL ENCOUNTER (OUTPATIENT)
Dept: CARDIAC REHAB | Age: 72
Setting detail: THERAPIES SERIES
Discharge: HOME | End: 2022-06-13
Payer: MEDICARE

## 2022-06-13 PROCEDURE — 93798 PHYS/QHP OP CAR RHAB W/ECG: CPT

## 2022-06-15 ENCOUNTER — HOSPITAL ENCOUNTER (OUTPATIENT)
Dept: CARDIAC REHAB | Age: 72
Setting detail: THERAPIES SERIES
Discharge: HOME | End: 2022-06-15
Payer: MEDICARE

## 2022-06-15 PROCEDURE — 93798 PHYS/QHP OP CAR RHAB W/ECG: CPT

## 2022-06-16 ENCOUNTER — TELEPHONE (OUTPATIENT)
Dept: CARDIOLOGY CLINIC | Age: 72
End: 2022-06-16

## 2022-06-16 NOTE — TELEPHONE ENCOUNTER
This is lowest possible dose that we can do. Sorry this is difficult. We could change to valsartan 40 mg once daily alone but I would prefer the Entresto to this. Please ensure they are using a pill cutter for this.     ALIYA

## 2022-06-16 NOTE — TELEPHONE ENCOUNTER
Pts wife Haresh Merritt stated that rjm put pt on Entresto when pt went into afib. Radhajohanny René stated that at last ov on 05/23/2022 the medication was changed to 1/2 tablet in the am and 1/2 in pm. Haresh Merritt stated that the tablets are not scored and its difficult to cut it in half. Haresh Merritt would like to know if pt still needs to be on Entresto and if so is there another dosage he could take? Please advise.

## 2022-06-16 NOTE — TELEPHONE ENCOUNTER
Pts wife called. She wants to know if rjshahnaz wants to keep pt on Entresto long term? Pts wife stated that he was started on Entresto due to [address] stated that pt is no longer in afib and would like verification if pt should still be taking Entresto because it would determine if they need to  a new prescription. Sadia Hart stated that they are using a pill cutter but they are unsure if pt is getting full dose bc it just crushes the pill. Sadia Hart would like to be called at [phone].

## 2022-06-16 NOTE — TELEPHONE ENCOUNTER
Yes typically lifelong therapy. If cost continues to be an issue we can consider alternatives but I believe this to be one of the best medications for his heart.   Thank

## 2022-06-16 NOTE — TELEPHONE ENCOUNTER
Spoke to patient's wife is ok to stay on Entresto, just did not want to refill if doctor may change due to cost also. She did not know if this is something life long for patient? Put samples back for patient/wife to  to last until next appt with stephen on 07/2022    Is this a life long medication for patient?     Please advise

## 2022-06-17 ENCOUNTER — HOSPITAL ENCOUNTER (OUTPATIENT)
Dept: CARDIAC REHAB | Age: 72
Setting detail: THERAPIES SERIES
Discharge: HOME | End: 2022-06-17
Payer: MEDICARE

## 2022-06-17 PROCEDURE — 93798 PHYS/QHP OP CAR RHAB W/ECG: CPT

## 2022-06-20 ENCOUNTER — HOSPITAL ENCOUNTER (OUTPATIENT)
Dept: CARDIAC REHAB | Age: 72
Setting detail: THERAPIES SERIES
Discharge: HOME | End: 2022-06-20
Payer: MEDICARE

## 2022-06-20 PROCEDURE — 93798 PHYS/QHP OP CAR RHAB W/ECG: CPT

## 2022-06-22 ENCOUNTER — HOSPITAL ENCOUNTER (OUTPATIENT)
Dept: CARDIAC REHAB | Age: 72
Setting detail: THERAPIES SERIES
Discharge: HOME | End: 2022-06-22
Payer: MEDICARE

## 2022-06-22 PROCEDURE — 93798 PHYS/QHP OP CAR RHAB W/ECG: CPT

## 2022-06-24 ENCOUNTER — HOSPITAL ENCOUNTER (OUTPATIENT)
Dept: CARDIAC REHAB | Age: 72
Setting detail: THERAPIES SERIES
Discharge: HOME | End: 2022-06-24
Payer: MEDICARE

## 2022-06-24 PROCEDURE — 93798 PHYS/QHP OP CAR RHAB W/ECG: CPT

## 2022-06-27 ENCOUNTER — HOSPITAL ENCOUNTER (OUTPATIENT)
Dept: CARDIAC REHAB | Age: 72
Setting detail: THERAPIES SERIES
Discharge: HOME | End: 2022-06-27
Payer: MEDICARE

## 2022-06-27 PROCEDURE — 93798 PHYS/QHP OP CAR RHAB W/ECG: CPT

## 2022-06-29 ENCOUNTER — HOSPITAL ENCOUNTER (OUTPATIENT)
Dept: CARDIAC REHAB | Age: 72
Setting detail: THERAPIES SERIES
Discharge: HOME | End: 2022-06-29
Payer: MEDICARE

## 2022-06-29 PROCEDURE — 93798 PHYS/QHP OP CAR RHAB W/ECG: CPT

## 2022-07-01 ENCOUNTER — HOSPITAL ENCOUNTER (OUTPATIENT)
Dept: CARDIAC REHAB | Age: 72
Setting detail: THERAPIES SERIES
Discharge: HOME | End: 2022-07-01
Payer: MEDICARE

## 2022-07-01 PROCEDURE — 93798 PHYS/QHP OP CAR RHAB W/ECG: CPT

## 2022-07-06 ENCOUNTER — HOSPITAL ENCOUNTER (OUTPATIENT)
Dept: CARDIAC REHAB | Age: 72
Setting detail: THERAPIES SERIES
Discharge: HOME | End: 2022-07-06
Payer: MEDICARE

## 2022-07-06 PROCEDURE — 93798 PHYS/QHP OP CAR RHAB W/ECG: CPT

## 2022-07-08 ENCOUNTER — HOSPITAL ENCOUNTER (OUTPATIENT)
Dept: CARDIAC REHAB | Age: 72
Setting detail: THERAPIES SERIES
Discharge: HOME | End: 2022-07-08
Payer: MEDICARE

## 2022-07-08 PROCEDURE — 93798 PHYS/QHP OP CAR RHAB W/ECG: CPT

## 2022-07-11 ENCOUNTER — HOSPITAL ENCOUNTER (OUTPATIENT)
Dept: CARDIAC REHAB | Age: 72
Setting detail: THERAPIES SERIES
Discharge: HOME | End: 2022-07-11
Payer: MEDICARE

## 2022-07-11 ENCOUNTER — HOSPITAL ENCOUNTER (OUTPATIENT)
Dept: NON INVASIVE DIAGNOSTICS | Age: 72
Discharge: HOME | End: 2022-07-11
Payer: MEDICARE

## 2022-07-11 DIAGNOSIS — I48.0 PAROXYSMAL ATRIAL FIBRILLATION (HCC): ICD-10-CM

## 2022-07-11 DIAGNOSIS — E78.2 MIXED HYPERLIPIDEMIA: ICD-10-CM

## 2022-07-11 DIAGNOSIS — R42 LIGHTHEADEDNESS: ICD-10-CM

## 2022-07-11 DIAGNOSIS — I21.02 ST ELEVATION MYOCARDIAL INFARCTION INVOLVING LEFT ANTERIOR DESCENDING (LAD) CORONARY ARTERY (HCC): ICD-10-CM

## 2022-07-11 DIAGNOSIS — I25.5 ISCHEMIC CARDIOMYOPATHY: ICD-10-CM

## 2022-07-11 PROCEDURE — 93798 PHYS/QHP OP CAR RHAB W/ECG: CPT

## 2022-07-11 PROCEDURE — 93308 TTE F-UP OR LMTD: CPT

## 2022-07-11 NOTE — PROGRESS NOTES
CARDIOLOGY FOLLOW UP        Patient Name: Thor Polk  Primary Care physician: Dileep Prakash MD    Reason for Referral/Chief Complaint: Thor Polk is a 71 y.o. patient who is here today for follow up for STEMI s/p PCI. PAF noted on monitor. History of Present Illness:     Susan Johnson is a 71 y.o. patient with prior medical history notable for prior smoking history, PAF on monitor, ischemic CM with mild LV dysfunction, coronary artery disease with prior STEMI, paroxysmal atrial fibrillation, SVT and hyperlipidemia, presented today for follow-up. 4/10/22 pt presented with chest pain and found to have anterolateral STEMI and taken to Cath Lab for study. LHC showed LAD/Diag lesion with thrombus each treated with HOLDEN. Echo showed EF 40-45%. Residual RCA disease. He developed AF post PCI. Started on eliquis and discharged with monitor for SVT noted during admission. Two week monitor 4-13-22 noted AF/AFL 0.67%; 18 episodes of SVT longest one was 4/18/22 for 14 beats. LOV  05/23/22 endorsed positional dizziness. HR 40s at times. Little fatigued. No loss of consciousness. Tolerating blood thinner therapy. He had lost 9 lbs since 4/13/22. Decrease Entresto 24-26 mg to half twice a day to give him more Blood pressure given fatigue and postural dizziness. Change metoprolol XL to once daily dosing in the morning given his slow heart rate lasting throughout the morning time. In the [facility], Echo on 07/11/2022 EF 50-55%, mild HK of the anterolateral wall. AV mildly sclerotic, but opens well. Today, presents with spouse. Maintaining weight loss. Eating plant based diet. Admits to persistent positional dizziness. Doing cardiac rehab, no trouble with this. No LOC. He  denies any evidence of hematemesis, hemoptysis, melena, hematochezia or hematuria with blood thinner. The patient denies chest pain, shortness of breath at rest and dyspnea with exertion.  Denies palpitations. Denies paroxysmal nocturnal dyspnea, orthopnea, bendopnea, increasing lower extremity edema or weight gain. The patient endorses highest level of activity as cardiac rehab and home bicycle for 8 miles. The patient is compliant with medications. Cost of medications is affordable. No endorsed side effects. Home Medications:  Were reviewed and are listed in nursing record and/or below  Prior to Admission medications    Medication Sig Start Date End Date Taking? Authorizing Provider   sacubitril-valsartan (ENTRESTO) 24-26 MG per tablet Take half tablet twice a day 5/23/22  Yes Rondall Moritz, MD   metoprolol succinate (TOPROL XL) 25 MG extended release tablet Take 1 tablet by mouth daily 5/23/22  Yes Rondall Moritz, MD   nitroGLYCERIN (NITROSTAT) 0.4 MG SL tablet Place 0.4 mg under the tongue every 5 minutes as needed 4/19/22  Yes Historical Provider, MD   atorvastatin (LIPITOR) 80 MG tablet Take 1 tablet by mouth nightly 4/13/22  Yes Danette Luque MD   ticagrelor (BRILINTA) 90 MG TABS tablet Take 1 tablet by mouth 2 times daily 4/13/22  Yes Danette Luque MD   apixaban (ELIQUIS) 5 MG TABS tablet Take 1 tablet by mouth 2 times daily 4/13/22  Yes Bhavani Snow MD        CURRENT Medications:  No current facility-administered medications for this visit. Allergies:  Patient has no known allergies. Review of Systems:   A 14 point review of symptoms completed. Pertinent positives identified in the HPI, all other review of symptoms negative as below.       Objective:     Vitals:    07/26/22 1231 07/26/22 1234 07/26/22 1235   BP: (!) 90/56 (!) 88/54 (!) 90/54   Position: Supine Sitting Standing   Pulse: 55 52 58   SpO2: 95% 98% 97%   Weight: 195 lb (88.5 kg)     Height: 6' 1\" (1.854 m)        Weight: 195 lb (88.5 kg)       PHYSICAL EXAM:    General:  Alert, cooperative, no acute distress, appears stated age   Head:  Normocephalic, atraumatic   Eyes:  Conjunctiva/corneas clear, anicteric sclerae    Nose: Nares normal, no drainage or sinus tenderness   Throat: No abnormalities of the lips, oral mucosa or tongue. Neck: Trachea midline. Neck supple with no lymphadenopathy, thyroid not enlarged, symmetric, no tenderness/mass/nodules    Lungs:   Clear to auscultation bilaterally, no wheezes, no rales, no respiratory distress   Chest Wall:  No deformity or tenderness to palpation   Heart:  Regular rate and rhythm, normal S1, normal S2, no murmur, no rub, no S3/S4, PMI non-displaced. Abdomen:   Soft, non-tender, with normoactive bowel sounds. No masses, no hepatosplenomegaly   Extremities: No cyanosis, clubbing or pitting edema. Vascular: 2+ radial, 2+ posterior tibial pulses bilaterally. Brisk carotid upstrokes without carotid bruit. Skin: Skin color, texture, turgor are normal with no rashes or ulceration. Pysch: Euthymic mood, appropriate affect   Neurologic: Oriented to person, place and time. No slurred speech or facial asymmetry. No motor or sensory deficits on gross examination.          Labs:   CBC:   Lab Results   Component Value Date/Time    WBC 11.2 04/13/2022 11:35 AM    RBC 5.25 04/13/2022 11:35 AM    HGB 17.3 04/13/2022 11:35 AM    HCT 51.7 04/13/2022 11:35 AM    MCV 98.5 04/13/2022 11:35 AM    RDW 13.8 04/13/2022 11:35 AM     04/13/2022 11:35 AM     CMP:  Lab Results   Component Value Date/Time     05/11/2022 10:34 AM    K 4.4 05/11/2022 10:34 AM    K 3.9 04/13/2022 11:35 AM     05/11/2022 10:34 AM    CO2 24 05/11/2022 10:34 AM    BUN 16 05/11/2022 10:34 AM    CREATININE 1.1 05/11/2022 10:34 AM    GFRAA >60 05/11/2022 10:34 AM    AGRATIO 1.6 04/10/2022 12:00 PM    LABGLOM >60 05/11/2022 10:34 AM    GLUCOSE 111 05/11/2022 10:34 AM    PROT 7.7 04/10/2022 12:00 PM    CALCIUM 9.7 05/11/2022 10:34 AM    BILITOT 0.4 04/10/2022 12:00 PM    ALKPHOS 61 04/10/2022 12:00 PM    AST 68 04/10/2022 12:00 PM    ALT 27 04/10/2022 12:00 PM     PT/INR:  No results found for: PTINR  HgBA1c:  Lab Results   Component Value Date    LABA1C 5.6 2022     Lab Results   Component Value Date    TROPONINI 0.37 (H) 04/10/2022     Lab Results   Component Value Date    CHOL 114 2022    CHOL 228 (H) 04/10/2022     Lab Results   Component Value Date    TRIG 79 2022    TRIG 113 04/10/2022     Lab Results   Component Value Date    HDL 32 (L) 2022    HDL 52 04/10/2022     Lab Results   Component Value Date    LDLCALC 66 2022    LDLCALC 153 (H) 04/10/2022     Lab Results   Component Value Date    LABVLDL 16 2022    LABVLDL 23 04/10/2022     No results found for: CHOLHDLRATIO      Cardiac Data:     Echo Limited: 2022  Summary   Limited exam for LVEF. Global LV function is lower normal with EF estimated from 50-55%. There is mild HK of the anterolateral wall. The aortic valve appears mildly sclerotic but opens well. [id number]  Sinus  Rhythm   -Left axis -anterior fascicular block. - Anterior -lateral infarct  (age undetermined). ABNORMAL     Echo: 2022   Summary  The left ventricular systolic function is severely reduced with an ejection fraction of 40-45 %. There is severe hypokinesis of the anterior and apical walls consistent with infarction within the territory of the left anterior descending  artery. Definity contrast administered with no evidence of left ventricular mass or thrombus noted. The aortic valve appears trileaflet but sclerotic. Mild mitral regurgitation. Cardiac catheterization 4/10/2022  Right coronary 75% stenosis proximal vessel  90% hazy, thrombotic stenosis proximal portion mid left anterior descending  Large first diagonal, 90% stenosis, large clot burden  Unable to place Penumbra for aspiration thrombectomy, abandoned, with subsequent PCI LAD, D1.   3.0 x 12 mm Resolute Vader drug-eluting stent to ostial diagonal.   3.5 mm x 22 mm Resolute Vader drug-eluting stent into the  LAD subsequently.   It was an effective T-stent of the LAD and the diagonal branch. The LAD had RUDY-3 blood flow, but the diagonal branch had RUDY-1 flow refractory to multiple medications and could not pass balloon back into diagonal.   Technically difficult PCI procedure. CONCLUSIONS:  1. Successful T-stenting of diagonal LAD. There was a 3.5 x 22 mm  length Resolute Benjamín drug-eluting stent in the LAD and 3.0 x 12 mm  Resolute Benjaímn drug-eluting stent in the diagonal branch. RUDY-3 blood  flow in the LAD and RUDY-2 flow in the diagonal branch. LV ejection  fraction estimated at 40% on left ventriculography with EDP of 14 mmHg. 2.  Right coronary artery has 75% to 80% proximal coronary stenosis,  dominant right coronary artery and minor irregularities elsewhere. LV  EF 40%, anterolateral wall hypokinesia, significant LVEDP 14.  3.  Angio-Seal successful right femoral arteriotomy. Additional studies:   Two week monitor 4-13-22 noted AF/AFL 0.67%; 18 episodes of SVT longest one was 4/18/22 for 14 beats. Impression and Plan:      CAD with anterolateral STEMI, status post PCI LAD/Diagonal, residual proximal RCA stenosis without angina and with recovery of LVEF by echo  Ischemic CM with mild LV dysfunction status post recovery  Paroxysmal atrial fibrillation, low burden  SVT    Hyperlipidemia  Prior tobacco use, now quit  Lightheadedness  Low BP    [id number] Score for Atrial Fibrillation Stroke Risk   Risk   Factors  Component Value   C CHF No 0   H HTN No 0   A2 Age >= 75 No,  (76 y.o.) 0   D DM No 0   S2 Prior Stroke/TIA No 0   V Vascular Disease Yes 1   A Age 74-69 Yes,  (76 y.o.) 1   Sc Sex male 0    [id number]  Score  2   Score last updated 5/23/22 3:96 PM EDT    Click here for a link to the UpToDate guideline \"Atrial Fibrillation: Anticoagulation therapy to prevent embolization    Disclaimer: Risk Score calculation is dependent on accuracy of patient problem list and past encounter diagnosis.       Patient Active Problem List   Diagnosis    Acute ST elevation myocardial infarction (STEMI) due to occlusion of left anterior descending (LAD) coronary artery (HCC)    STEMI (ST elevation myocardial infarction) (HCC)    Acute anterior myocardial infarction (HCC)    Acute systolic heart failure (HCC)    Ischemic cardiomyopathy    Mixed hyperlipidemia    Atrial fibrillation ([facility] [address]    Lightheadedness       PLAN:  1. Continue metoprolol succinate for GDMT and rate-control/anti-anginal therapy. 2. Stop Entresto due to dizziness/low BP. 3. Continue brilinita/eliquis and statin  4. Continue cardiac rehab    5. Refills of prescriptions sent to pharmacy. 6. Continues Mediterranean diet. 7. BP goal 130/80. Plan to follow up 6 months     This note is scribed in the presence of Madisyn Moreno by Gerard Bennett RN      The scribes documentation has been prepared under my direction and personally reviewed by me in its entirety. I confirm that the note above accurately reflects all work, treatment, procedures, and medical decision making performed by me. Tamiko Guillen MD, personally performed the services described in this documentation as scribed by Gerard Bennett RN in my presence, and it is both accurate and complete to the best of our ability. I will address the patient's cardiac risk factors and adjusted pharmacologic treatment as needed. In addition, I have reinforced the need for patient directed risk factor modification. All questions and concerns were addressed to the patient/family. Alternatives to my treatment were discussed. Thank you for allowing us to participate in the care of Luca Leon. Please call me with any questions [phone].     Madisyn Moreno MD, [facility]  Cardiovascular Disease  [facility]  [phone] [facility]  [phone] [address] [place] [address]  7/26/2022 1:07 PM

## 2022-07-12 ENCOUNTER — TELEPHONE (OUTPATIENT)
Dept: CARDIOLOGY CLINIC | Age: 72
End: 2022-07-12

## 2022-07-12 NOTE — TELEPHONE ENCOUNTER
----- Message from Latrell Gifford MD sent at 7/12/2022  7:50 AM EDT -----  Please let the patient know the following: Echocardiogram shows normal heart strength overall with mild residual weakness of in the distribution of his heart attack. Overall function improved from 40% to 50-55%. This is great news . Follow up as planned.

## 2022-07-13 ENCOUNTER — HOSPITAL ENCOUNTER (OUTPATIENT)
Dept: CARDIAC REHAB | Age: 72
Setting detail: THERAPIES SERIES
Discharge: HOME | End: 2022-07-13
Payer: MEDICARE

## 2022-07-13 PROCEDURE — 93798 PHYS/QHP OP CAR RHAB W/ECG: CPT

## 2022-07-15 ENCOUNTER — HOSPITAL ENCOUNTER (OUTPATIENT)
Dept: CARDIAC REHAB | Age: 72
Setting detail: THERAPIES SERIES
Discharge: HOME | End: 2022-07-15
Payer: MEDICARE

## 2022-07-15 PROCEDURE — 93798 PHYS/QHP OP CAR RHAB W/ECG: CPT

## 2022-07-18 ENCOUNTER — HOSPITAL ENCOUNTER (OUTPATIENT)
Dept: CARDIAC REHAB | Age: 72
Setting detail: THERAPIES SERIES
Discharge: HOME | End: 2022-07-18
Payer: MEDICARE

## 2022-07-18 PROCEDURE — 93798 PHYS/QHP OP CAR RHAB W/ECG: CPT

## 2022-07-20 ENCOUNTER — HOSPITAL ENCOUNTER (OUTPATIENT)
Dept: CARDIAC REHAB | Age: 72
Setting detail: THERAPIES SERIES
Discharge: HOME | End: 2022-07-20
Payer: MEDICARE

## 2022-07-20 VITALS — BODY MASS INDEX: 25.86 KG/M2 | WEIGHT: 196 LBS

## 2022-07-20 PROCEDURE — 93798 PHYS/QHP OP CAR RHAB W/ECG: CPT

## 2022-07-20 ASSESSMENT — EXERCISE STRESS TEST
PEAK_RPE: 14
PEAK_METS: 4.3
PEAK_HR: 109
PEAK_RPE: 14
PEAK_HR: 109
PEAK_METS: 4.3

## 2022-07-22 ENCOUNTER — HOSPITAL ENCOUNTER (OUTPATIENT)
Dept: CARDIAC REHAB | Age: 72
Setting detail: THERAPIES SERIES
Discharge: HOME | End: 2022-07-22
Payer: MEDICARE

## 2022-07-22 PROCEDURE — 93798 PHYS/QHP OP CAR RHAB W/ECG: CPT

## 2022-07-25 ENCOUNTER — HOSPITAL ENCOUNTER (OUTPATIENT)
Dept: CARDIAC REHAB | Age: 72
Setting detail: THERAPIES SERIES
Discharge: HOME | End: 2022-07-25
Payer: MEDICARE

## 2022-07-25 PROCEDURE — 93798 PHYS/QHP OP CAR RHAB W/ECG: CPT

## 2022-07-26 ENCOUNTER — OFFICE VISIT (OUTPATIENT)
Dept: CARDIOLOGY CLINIC | Age: 72
End: 2022-07-26
Payer: MEDICARE

## 2022-07-26 VITALS
HEART RATE: 58 BPM | OXYGEN SATURATION: 97 % | HEIGHT: 73 IN | SYSTOLIC BLOOD PRESSURE: 90 MMHG | BODY MASS INDEX: 25.84 KG/M2 | DIASTOLIC BLOOD PRESSURE: 54 MMHG | WEIGHT: 195 LBS

## 2022-07-26 DIAGNOSIS — I48.0 PAROXYSMAL ATRIAL FIBRILLATION (HCC): ICD-10-CM

## 2022-07-26 DIAGNOSIS — I21.02 ACUTE ST ELEVATION MYOCARDIAL INFARCTION (STEMI) DUE TO OCCLUSION OF LEFT ANTERIOR DESCENDING (LAD) CORONARY ARTERY (HCC): Primary | ICD-10-CM

## 2022-07-26 DIAGNOSIS — I50.21 ACUTE SYSTOLIC HEART FAILURE (HCC): ICD-10-CM

## 2022-07-26 DIAGNOSIS — I21.02 ST ELEVATION MYOCARDIAL INFARCTION INVOLVING LEFT ANTERIOR DESCENDING (LAD) CORONARY ARTERY (HCC): ICD-10-CM

## 2022-07-26 DIAGNOSIS — I21.09 ACUTE ANTERIOR MYOCARDIAL INFARCTION (HCC): ICD-10-CM

## 2022-07-26 DIAGNOSIS — I25.5 ISCHEMIC CARDIOMYOPATHY: ICD-10-CM

## 2022-07-26 PROCEDURE — 1123F ACP DISCUSS/DSCN MKR DOCD: CPT | Performed by: INTERNAL MEDICINE

## 2022-07-26 PROCEDURE — 99214 OFFICE O/P EST MOD 30 MIN: CPT | Performed by: INTERNAL MEDICINE

## 2022-07-26 RX ORDER — METOPROLOL SUCCINATE 25 MG/1
25 TABLET, EXTENDED RELEASE ORAL DAILY
Qty: 90 TABLET | Refills: 3 | Status: SHIPPED | OUTPATIENT
Start: 2022-07-26

## 2022-07-26 RX ORDER — ATORVASTATIN CALCIUM 80 MG/1
80 TABLET, FILM COATED ORAL NIGHTLY
Qty: 90 TABLET | Refills: 3 | Status: SHIPPED | OUTPATIENT
Start: 2022-07-26

## 2022-07-26 NOTE — LETTER
Thor Polk  1950        CARDIOLOGY FOLLOW UP        Patient Name: Thor Polk  Primary Care physician: Dileep Prakash MD    Reason for Referral/Chief Complaint: Thor Polk is a 71 y.o. patient who is here today for follow up for STEMI s/p PCI. PAF noted on monitor. History of Present Illness:     Susan Johnson is a 71 y.o. patient with prior medical history notable for prior smoking history, PAF on monitor, ischemic CM with mild LV dysfunction, coronary artery disease with prior STEMI, paroxysmal atrial fibrillation, SVT and hyperlipidemia, presented today for follow-up. 4/10/22 pt presented with chest pain and found to have anterolateral STEMI and taken to Cath Lab for study. LHC showed LAD/Diag lesion with thrombus each treated with HOLDEN. Echo showed EF 40-45%. Residual RCA disease. He developed AF post PCI. Started on eliquis and discharged with monitor for SVT noted during admission. Two week monitor 4-13-22 noted AF/AFL 0.67%; 18 episodes of SVT longest one was 4/18/22 for 14 beats. LOV  05/23/22 endorsed positional dizziness. HR 40s at times. Little fatigued. No loss of consciousness. Tolerating blood thinner therapy. He had lost 9 lbs since 4/13/22. Decrease Entresto 24-26 mg to half twice a day to give him more Blood pressure given fatigue and postural dizziness. Change metoprolol XL to once daily dosing in the morning given his slow heart rate lasting throughout the morning time. In the [facility], Echo on 07/11/2022 EF 50-55%, mild HK of the anterolateral wall. AV mildly sclerotic, but opens well. Today, presents with spouse. Maintaining weight loss. Eating plant based diet. Admits to persistent positional dizziness. Doing cardiac rehab, no trouble with this. No LOC. He  denies any evidence of hematemesis, hemoptysis, melena, hematochezia or hematuria with blood thinner.      The patient denies chest pain, shortness of breath at rest and dyspnea with exertion. Denies palpitations. Denies paroxysmal nocturnal dyspnea, orthopnea, bendopnea, increasing lower extremity edema or weight gain. The patient endorses highest level of activity as cardiac rehab and home bicycle for 8 miles. The patient is compliant with medications. Cost of medications is affordable. No endorsed side effects. Home Medications:  Were reviewed and are listed in nursing record and/or below  Prior to Admission medications    Medication Sig Start Date End Date Taking? Authorizing Provider   sacubitril-valsartan (ENTRESTO) 24-26 MG per tablet Take half tablet twice a day 5/23/22  Yes Heather Corbin MD   metoprolol succinate (TOPROL XL) 25 MG extended release tablet Take 1 tablet by mouth daily 5/23/22  Yes Heather Corbin MD   nitroGLYCERIN (NITROSTAT) 0.4 MG SL tablet Place 0.4 mg under the tongue every 5 minutes as needed 4/19/22  Yes Historical Provider, MD   atorvastatin (LIPITOR) 80 MG tablet Take 1 tablet by mouth nightly 4/13/22  Yes Tereza Coley MD   ticagrelor (BRILINTA) 90 MG TABS tablet Take 1 tablet by mouth 2 times daily 4/13/22  Yes Tereza Coley MD   apixaban (ELIQUIS) 5 MG TABS tablet Take 1 tablet by mouth 2 times daily 4/13/22  Yes Maricruz Jones MD        CURRENT Medications:  No current facility-administered medications for this visit. Allergies:  Patient has no known allergies. Review of Systems:   A 14 point review of symptoms completed. Pertinent positives identified in the HPI, all other review of symptoms negative as below.       Objective:     Vitals:    07/26/22 1231 07/26/22 1234 07/26/22 1235   BP: (!) 90/56 (!) 88/54 (!) 90/54   Position: Supine Sitting Standing   Pulse: 55 52 58   SpO2: 95% 98% 97%   Weight: 195 lb (88.5 kg)     Height: 6' 1\" (1.854 m)        Weight: 195 lb (88.5 kg)       PHYSICAL EXAM:    General:  Alert, cooperative, no acute distress, appears stated age   Head:  Normocephalic, atraumatic   Eyes: Conjunctiva/corneas clear, anicteric sclerae    Nose: Nares normal, no drainage or sinus tenderness   Throat: No abnormalities of the lips, oral mucosa or tongue. Neck: Trachea midline. Neck supple with no lymphadenopathy, thyroid not enlarged, symmetric, no tenderness/mass/nodules    Lungs:   Clear to auscultation bilaterally, no wheezes, no rales, no respiratory distress   Chest Wall:  No deformity or tenderness to palpation   Heart:  Regular rate and rhythm, normal S1, normal S2, no murmur, no rub, no S3/S4, PMI non-displaced. Abdomen:   Soft, non-tender, with normoactive bowel sounds. No masses, no hepatosplenomegaly   Extremities: No cyanosis, clubbing or pitting edema. Vascular: 2+ radial, 2+ posterior tibial pulses bilaterally. Brisk carotid upstrokes without carotid bruit. Skin: Skin color, texture, turgor are normal with no rashes or ulceration. Pysch: Euthymic mood, appropriate affect   Neurologic: Oriented to person, place and time. No slurred speech or facial asymmetry. No motor or sensory deficits on gross examination.          Labs:   CBC:   Lab Results   Component Value Date/Time    WBC 11.2 04/13/2022 11:35 AM    RBC 5.25 04/13/2022 11:35 AM    HGB 17.3 04/13/2022 11:35 AM    HCT 51.7 04/13/2022 11:35 AM    MCV 98.5 04/13/2022 11:35 AM    RDW 13.8 04/13/2022 11:35 AM     04/13/2022 11:35 AM     CMP:  Lab Results   Component Value Date/Time     05/11/2022 10:34 AM    K 4.4 05/11/2022 10:34 AM    K 3.9 04/13/2022 11:35 AM     05/11/2022 10:34 AM    CO2 24 05/11/2022 10:34 AM    BUN 16 05/11/2022 10:34 AM    CREATININE 1.1 05/11/2022 10:34 AM    GFRAA >60 05/11/2022 10:34 AM    AGRATIO 1.6 04/10/2022 12:00 PM    LABGLOM >60 05/11/2022 10:34 AM    GLUCOSE 111 05/11/2022 10:34 AM    PROT 7.7 04/10/2022 12:00 PM    CALCIUM 9.7 05/11/2022 10:34 AM    BILITOT 0.4 04/10/2022 12:00 PM    ALKPHOS 61 04/10/2022 12:00 PM    AST 68 04/10/2022 12:00 PM    ALT 27 04/10/2022 12:00 PM PT/INR:  No results found for: PTINR  HgBA1c:  Lab Results   Component Value Date    LABA1C 5.6 2022     Lab Results   Component Value Date    TROPONINI 0.37 (H) 04/10/2022     Lab Results   Component Value Date    CHOL 114 2022    CHOL 228 (H) 04/10/2022     Lab Results   Component Value Date    TRIG 79 2022    TRIG 113 04/10/2022     Lab Results   Component Value Date    HDL 32 (L) 2022    HDL 52 04/10/2022     Lab Results   Component Value Date    LDLCALC 66 2022    LDLCALC 153 (H) 04/10/2022     Lab Results   Component Value Date    LABVLDL 16 2022    LABVLDL 23 04/10/2022     No results found for: CHOLHDLRATIO      Cardiac Data:     Echo Limited: 2022  Summary   Limited exam for LVEF. Global LV function is lower normal with EF estimated from 50-55%. There is mild HK of the anterolateral wall. The aortic valve appears mildly sclerotic but opens well. [id number]  Sinus  Rhythm   -Left axis -anterior fascicular block. - Anterior -lateral infarct  (age undetermined). ABNORMAL     Echo: 2022   Summary  The left ventricular systolic function is severely reduced with an ejection fraction of 40-45 %. There is severe hypokinesis of the anterior and apical walls consistent with infarction within the territory of the left anterior descending  artery. Definity contrast administered with no evidence of left ventricular mass or thrombus noted. The aortic valve appears trileaflet but sclerotic. Mild mitral regurgitation.     Cardiac catheterization 4/10/2022  Right coronary 75% stenosis proximal vessel  90% hazy, thrombotic stenosis proximal portion mid left anterior descending  Large first diagonal, 90% stenosis, large clot burden  Unable to place Penumbra for aspiration thrombectomy, abandoned, with subsequent PCI LAD, D1.   3.0 x 12 mm Resolute Benjamín drug-eluting stent to ostial diagonal.   3.5 mm x 22 mm Resolute Benjamín drug-eluting stent into diagnosis. Patient Active Problem List   Diagnosis    Acute ST elevation myocardial infarction (STEMI) due to occlusion of left anterior descending (LAD) coronary artery ([facility] [address]    STEMI (ST elevation myocardial infarction) ([facility] [address]    Acute anterior myocardial infarction ([facility] [address]    Acute systolic heart failure ([facility] [address]    Ischemic cardiomyopathy    Mixed hyperlipidemia    Atrial fibrillation ([facility] [address]    Lightheadedness       PLAN:  1. Continue metoprolol succinate for GDMT and rate-control/anti-anginal therapy. 2. Stop Entresto due to dizziness/low BP. 3. Continue brilinita/eliquis and statin  4. Continue cardiac rehab    5. Refills of prescriptions sent to pharmacy. 6. Continues Mediterranean diet. 7. BP goal 130/80. Plan to follow up 6 months     This note is scribed in the presence of Judy Chacon by Sheryl Meneses RN      The scribes documentation has been prepared under my direction and personally reviewed by me in its entirety. I confirm that the note above accurately reflects all work, treatment, procedures, and medical decision making performed by me. Bernardo Kruger MD, personally performed the services described in this documentation as scribed by Sheryl Meneses RN in my presence, and it is both accurate and complete to the best of our ability. I will address the patient's cardiac risk factors and adjusted pharmacologic treatment as needed. In addition, I have reinforced the need for patient directed risk factor modification. All questions and concerns were addressed to the patient/family. Alternatives to my treatment were discussed. Thank you for allowing us to participate in the care of Patria Hackett. Please call me with any questions [phone].     Judy Chacon MD, [facility]  Cardiovascular Disease  [address]  [phone] [facility]  [phone] [address] [place] [address]  7/26/2022 1:07 PM

## 2022-07-26 NOTE — PATIENT INSTRUCTIONS
PLAN:  1. Stay on metoprolol succinate. Stay on Lipitor. 2. Stop Entresto(valsartan). 3. Continue current medications. 4. Continue cardiac rehab as scheduled. 5. Refills of prescriptions sent to pharmacy. 6. Mediterranean diet. 7.After cardiac rehab continue to work out with ultimate goal of 150 minutes of aerobic exercise weekly. 8. BP goal 130/80.   Plan to follow up 6 months

## 2022-07-27 ENCOUNTER — HOSPITAL ENCOUNTER (OUTPATIENT)
Dept: CARDIAC REHAB | Age: 72
Setting detail: THERAPIES SERIES
Discharge: HOME | End: 2022-07-27
Payer: MEDICARE

## 2022-07-27 PROCEDURE — 93798 PHYS/QHP OP CAR RHAB W/ECG: CPT

## 2022-07-27 ASSESSMENT — EJECTION FRACTION: EF_VALUE: 40

## 2022-07-27 NOTE — PLAN OF CARE
Individual Treatment Plan  Cardiac Rehabilitation    Reassessment  Name: Dudley Lambert Reassessment: 80 days   : 1950 Primary Diagnosis: STEMI    Age: 71 y.o. Referring Physician:  Tiffany Hewitt MD   MRN: [id number] Primary Care Physician: Sowmya Lam MD   No Known Allergies    Date: 2022    Exercise Assessment  Stages of Change: Action   Risk Stratification: Medium  Fall Risk: No flowsheet data found. Assistive Devices: None    Antihypertensives: yes - Metoprolol succinate,   Entresto    Exercise Prescription        Mode: . Treadmill, Bike (Upright or recumbent), NuStep, Rower, SciFit, Airdyne, Arm Ergometer, Recumbent Eliptical, and Walking      Frequency: 2-3 days/week supervised      Intensity:       RPE 11-14      Target Heart Rate zone: , No change. Duration: 30+ minutes/day      Resistance Training: Yes, 3 days per week      Progression: Increase exercise by 0.5 METs every 1 to 2 weeks to goal of 4 to 6 METs      Supplemental oxygen: is not on home oxygen therapy. Patient tolerates moderate levels of exercise well in target RPE/HR zones. Max Met Level: 5.7  Patient in SR-ST.      Plan  Home Exercise:Yes, Hiking and walking  Mode:Walking  Resistance Training: yes     Target Goals  Achieve exercise to 3-4.9 METs , Adhere to 5 days per week exercise program , Adhere to cardiac exercise guidelines , Adhere to independent aerobic home exercise program, [facility], fitness center , Increase exercise endurance and stamina , Increase functional capacity as compared to initial assessment , and Increase functional capacity as measured by a 6 minute walk or shuttle walk   Previous goals Safely return to normal activity    Education  Mr. Elizabeth Olmstead was educated on the importance of Importance of physical activity and exercise progression    Nutrition Assessment  Stages of Change: Action            Nutrition Survey: Rate Your Plate Score:       2-57% Weight Loss: 0   Patient Weight Goal: 196   Recommended Diet: reduce sodium intake  Diabetic:  No  Diabetic Medications: no  A1C:    Lab Results   Component Value Date    LABA1C 5.6 04/11/2022                  Fasting Blood Glucose: No results found for: GLU n/a  Cholesterol, Total   Date Value Ref Range Status   05/25/2022 114 0 - 199 mg/dL Final     HDL   Date Value Ref Range Status   05/25/2022 32 (L) 40 - 60 mg/dL Final       Lab Results   Component Value Date    LDLCALC 66 05/25/2022     Triglycerides   Date Value Ref Range Status   05/25/2022 79 0 - 150 mg/dL Final       Alcohol Use:ALCHXP@     Nutrition Intervention  Attend education classes related to nutrition    Target Goals  Complete cardiac diet classes  and Maintain weight     Previous goals learn about heart healthy diet    Education  Mr. Marilu Rivas was educated on the importance of nutrition guidelines. Psychosocial Assessment  Stages of Change: Action  Occupation: Retired Currently working: not applicable  Estimated return to work date: n/a    Psychosocial Test  Tool Used: PHQ-2/9 Today's PHQ:  No flowsheet data found.   Interpretation of Total Score Depression Severity: 1-4 = Minimal depression, 5-9 = Mild depression, 10-14 = Moderate depression, 15-19 = Moderately severe depression, 20-27 = Severe depression    Reports psychosocial symptoms: no  Currently on medication therapy: not applicable  Currently seeing a mental health professional: yes  Positive support system: yes    Psychosocial Intervention  Participate in an exercise program that improves psychosocial symptoms    Target Goals  Return to ADLs/desired activities  and Improve quality of live, self-efficacy and depression screening scores as measured by the appropriate tool   Previous goals resume activity    Education Assessment  Stages of Change: Action  Barriers to Learning: No barriers  Personal Learning Style: Verbal and Video  Social History     Tobacco Use   Smoking Status Former    Packs/day: 0.50    Years: 20.00 Pack years: 10.00    Types: Cigarettes    Quit date: 2006    Years since quittin.5   Smokeless Tobacco Never       Education Intervention/Learning Needs Identified  Exercise, Risk factor for CAD, and Stress management and relaxation    Target Goals  Restore to highest level of independent functionality     Education  Mr. Jeannette Baker was educated on the importance of relaxation techniques                           Provider Review and Approval of this ITP    The above treatment plan and goals have been set for your patient during Cardiac Rehabilitation. Please review and Electronically Cosign/ or Sign (if Fax Provider)            *Please comment and make changes to the EX RX or treatment plan if needed*                       Electronic Provider comment:              Please indicate with Cosign Comment.                    Electronically signed by Jaelyn Justice RN on 22 at 1:53 PM EDT

## 2022-07-29 ENCOUNTER — HOSPITAL ENCOUNTER (OUTPATIENT)
Dept: CARDIAC REHAB | Age: 72
Setting detail: THERAPIES SERIES
Discharge: HOME | End: 2022-07-29
Payer: MEDICARE

## 2022-07-29 PROCEDURE — 93798 PHYS/QHP OP CAR RHAB W/ECG: CPT

## 2022-08-01 ENCOUNTER — HOSPITAL ENCOUNTER (OUTPATIENT)
Dept: CARDIAC REHAB | Age: 72
Setting detail: THERAPIES SERIES
Discharge: HOME | End: 2022-08-01
Payer: MEDICARE

## 2022-08-01 PROCEDURE — 93798 PHYS/QHP OP CAR RHAB W/ECG: CPT

## 2022-08-03 ENCOUNTER — HOSPITAL ENCOUNTER (OUTPATIENT)
Dept: CARDIAC REHAB | Age: 72
Setting detail: THERAPIES SERIES
Discharge: HOME | End: 2022-08-03
Payer: MEDICARE

## 2022-08-03 PROCEDURE — 93798 PHYS/QHP OP CAR RHAB W/ECG: CPT

## 2022-08-05 ENCOUNTER — HOSPITAL ENCOUNTER (OUTPATIENT)
Dept: CARDIAC REHAB | Age: 72
Setting detail: THERAPIES SERIES
Discharge: HOME | End: 2022-08-05
Payer: MEDICARE

## 2022-08-05 PROCEDURE — 93798 PHYS/QHP OP CAR RHAB W/ECG: CPT

## 2022-08-08 ENCOUNTER — HOSPITAL ENCOUNTER (OUTPATIENT)
Dept: CARDIAC REHAB | Age: 72
Setting detail: THERAPIES SERIES
Discharge: HOME | End: 2022-08-08
Payer: MEDICARE

## 2022-08-08 PROCEDURE — 93798 PHYS/QHP OP CAR RHAB W/ECG: CPT

## 2022-08-08 RX ORDER — TICAGRELOR 90 MG/1
TABLET ORAL
Qty: 60 TABLET | Refills: 3 | OUTPATIENT
Start: 2022-08-08

## 2022-08-08 RX ORDER — ATORVASTATIN CALCIUM 80 MG/1
TABLET, FILM COATED ORAL
Qty: 90 TABLET | Refills: 0 | OUTPATIENT
Start: 2022-08-08

## 2022-08-08 NOTE — TELEPHONE ENCOUNTER
Refill Request - Not our patient .   Pt see's Dr. Kamaljit Narvaez at [facility]       Last Seen: Last Seen Department: Visit date not found  Last Seen by PCP: Visit date not found    Last Written: 7/26/22 #90 with 3 refills - by Dr. Ly Sprague     Next Appointment:   Future Appointments   Date Time Provider Floresita Wade   8/10/2022  8:00 AM SCHEDULE, [address] [facility]  [facility]   8/12/2022  8:00 AM SCHEDULE, [address] [facility]  [facility]   8/15/2022  8:00 AM SCHEDULE, [facility] [address] [facility]   8/17/2022  8:00 AM SCHEDULE, [facility] [address] [facility]   8/19/2022  8:00 AM SCHEDULE, [facility] [address] [facility]   8/22/2022  8:00 AM SCHEDULE, [facility] [address] [facility]   8/24/2022  8:00 AM SCHEDULE, [address] [facility] [address] [facility]   8/26/2022  8:00 AM SCHEDULE, [address] [facility] [address] [facility]   8/29/2022  8:00 AM SCHEDULE, [facility] [address] [facility]   8/31/2022  8:00 AM SCHEDULE, [address] [facility] [address] [facility]   9/2/2022  8:00 AM SCHEDULE, [facility] [address] [facility]   9/5/2022  8:00 AM SCHEDULE, [facility] [address] [facility]   9/7/2022  8:00 AM SCHEDULE, [facility] [address] [facility]   9/9/2022  8:00 AM SCHEDULE, [facility] CP [facility]   9/12/2022  8:00 AM SCHEDULE, [facility] [address] [facility]   9/14/2022  8:00 AM SCHEDULE, [facility] [address] [facility]   9/16/2022  8:00 [facility] [facility] [address] [facility]             Requested Prescriptions     Pending Prescriptions Disp Refills    atorvastatin (LIPITOR) 80 MG tablet [Pharmacy Med Name: ATORVASTATIN 80 MG TABLET] 90 tablet 0     Sig: TAKE ONE TABLET BY MOUTH ONCE NIGHTLY    BRILINTA 90 MG TABS tablet [Pharmacy Med Name: BRILINTA 90 MG TABLET] 60 tablet 3     Sig: TAKE ONE TABLET BY MOUTH TWICE A DAY

## 2022-08-10 ENCOUNTER — HOSPITAL ENCOUNTER (OUTPATIENT)
Dept: CARDIAC REHAB | Age: 72
Setting detail: THERAPIES SERIES
Discharge: HOME | End: 2022-08-10
Payer: MEDICARE

## 2022-08-10 PROCEDURE — 93798 PHYS/QHP OP CAR RHAB W/ECG: CPT

## 2022-08-12 ENCOUNTER — APPOINTMENT (OUTPATIENT)
Dept: CARDIAC REHAB | Age: 72
End: 2022-08-12
Payer: MEDICARE

## 2022-08-15 ENCOUNTER — APPOINTMENT (OUTPATIENT)
Dept: CARDIAC REHAB | Age: 72
End: 2022-08-15
Payer: MEDICARE

## 2022-08-17 ENCOUNTER — APPOINTMENT (OUTPATIENT)
Dept: CARDIAC REHAB | Age: 72
End: 2022-08-17
Payer: MEDICARE

## 2022-08-19 ENCOUNTER — APPOINTMENT (OUTPATIENT)
Dept: CARDIAC REHAB | Age: 72
End: 2022-08-19
Payer: MEDICARE

## 2022-08-22 ENCOUNTER — APPOINTMENT (OUTPATIENT)
Dept: CARDIAC REHAB | Age: 72
End: 2022-08-22
Payer: MEDICARE

## 2022-08-24 ENCOUNTER — APPOINTMENT (OUTPATIENT)
Dept: CARDIAC REHAB | Age: 72
End: 2022-08-24
Payer: MEDICARE

## 2022-08-26 ENCOUNTER — APPOINTMENT (OUTPATIENT)
Dept: CARDIAC REHAB | Age: 72
End: 2022-08-26
Payer: MEDICARE

## 2022-08-29 ENCOUNTER — APPOINTMENT (OUTPATIENT)
Dept: CARDIAC REHAB | Age: 72
End: 2022-08-29
Payer: MEDICARE

## 2022-08-31 ENCOUNTER — APPOINTMENT (OUTPATIENT)
Dept: CARDIAC REHAB | Age: 72
End: 2022-08-31
Payer: MEDICARE

## 2022-10-21 ENCOUNTER — TELEPHONE (OUTPATIENT)
Dept: CARDIOLOGY CLINIC | Age: 72
End: 2022-10-21

## 2022-10-21 NOTE — TELEPHONE ENCOUNTER
Pt wife called and stated dermatology wants to do a MOHS procedure on 10/27/22. Pt wife states you said top layer abrasions were ok and she just wants to make sure a MOHS is ok with him being on Eliquis. Please advise on your recommendations.

## 2022-10-24 NOTE — TELEPHONE ENCOUNTER
Attempted to call pt wife back. Left vm to call the office back.  If she returns call, please let her know she needs to call the dermatologist and find out if they prefer blood thinner be held, if they do, [place] stated it is okay, but it is up to the doctor doing the procedure

## 2022-10-27 ENCOUNTER — PROCEDURE VISIT (OUTPATIENT)
Dept: SURGERY | Age: 72
End: 2022-10-27
Payer: MEDICARE

## 2022-10-27 DIAGNOSIS — C44.622 SQUAMOUS CELL CARCINOMA OF ARM, RIGHT: Primary | ICD-10-CM

## 2022-10-27 PROCEDURE — 17313 MOHS 1 STAGE T/A/L: CPT | Performed by: DERMATOLOGY

## 2022-10-27 PROCEDURE — 12032 INTMD RPR S/A/T/EXT 2.6-7.5: CPT | Performed by: DERMATOLOGY

## 2022-10-27 NOTE — PATIENT INSTRUCTIONS
[facility] Mohs Surgery Office Hours:    Monday-Thursday  7:30 AM-4:30 PM    Friday  9:00 AM-1:00 PM    POST-OPERATIVE CARE FOR LIQUID SKIN ADHESIVE             Bandage change after 24 hours    During your procedure today, a liquid skin adhesive was used to close the wound. You do not have to have stiches removed. If has a clear to light purple shiny surface. You do not have to have this removed. It will dissolve (melt away) in about 1-2 weeks. Please follow these instructions to help you recover from your procedure and help your wound heal.    CARING FOR YOUR SURGICAL SITE  The bandage should remain on and completely dry for 24 hours. Do NOT get the bandage wet. After the first 24 hours, gently remove the remaining part of the bandage. It can be helpful to moisten the bandage edges in the shower. Be gentle around the area of the wound. Do not scrub, rub or pick at the skin glue. It will gradually dissolve in 1-2 weeks. Do not shave directly over the wound for one week. You can shave around the area. After one week you can start cleaning the area gently and resume all normal activity. No further restrictions. Use Sunscreen with SPF of at least 30 on the area around the wound. If the dressing comes off or if you have questions, or concerns about the dressing, please call the office for instructions! POST OPERATIVE INSTRUCTIONS    Activity: it is recommended to avoid strenuous activity such as lifting, pushing, pulling, running, power walking or contact sports for at least 2-7 days or as recommended by your provider. Eating and drinking: Do not drink alcohol for 48 hours after your procedure. Alcohol increases the chances of bleeding. Medicines   -If you have discomfort, take Acetaminophen (Tylenol or Extra Strength Tylenol). Follow the instructions and warning on the bottle. Bleeding: If bleeding occurs, Put firm pressure on the area with gauze for 20 minutes without peeking.  If the bleeding continues you may remove the bandage to locate where the bleeding is coming from and apply pressure for another 20 minutes with gauze and an ice pack. If the bleeding does not stop after you apply pressure and ice pack, call us right away. If you call after hours a call service will transfer you to the physician. If you cannot call or get through to the doctor, go to the nearest emergency room or urgent care facility. What to expect:  You may have these symptoms. They are normal and should get better with time:  Swelling. Swelling usually increases for the first 48 hours after your procedure and then begins to improve. Some soreness and redness around your wound. If we worked close to your eyes (forehead, nose, temple, or upper cheeks) your eyes may become swollen and/ or black and blue. Bruising, which could last 1 week or more. Pink and bumpy appearance to the scar. This may happen a few weeks after your procedure. After 4 weeks, you may gently massage the area each day with facial moisturizer or petroleum jelly (Vaseline or Aquaphor). This will help to smooth the skin and improve the appearance of the scar. The color of your scar will fade over time but may be pink for several months after the procedure. The scar may take 6 months to 1 year to reach its final color and appearance. \"Spitting\" suture. Occasionally, an inside suture (stitch) does not completely dissolve. When this happens, (generally 4-8 weeks after surgery), it causes a bump or \"pimple\" to form on the scar. This is easily removed and is not at all serious. It does not mean the skin cancer has returned. Contact us if it happens, but do not be alarmed. Vitamin E oil is NOT necessary. A good moisturizer is just as effective. Sunscreen IS necessary. Use at least and SPF 30 sunscreen daily- even in winter    [address] [place] [address]  -Scars take from 6 months to 1 year to fully mature.  After the area has healed, it may be helpful to gently massage the area with a moisturizer, petroleum jelly (Vaseline) or Aquaphor. This helps to soften the scar tissue. You can begin this 1 month after the day of your surgery. -A Silicone scar gel product may also be beneficial in regards to scar appearance. This can be used but is not usually necessary.  -The color of the scar will even out over time, but may remain pink for several months. Call us at [phone] right away if you have any of the following symptoms:  -Bleeding that you cannot stop (see highlighted area above)   -Pain that lasts longer than 48 hours  -Your wound becomes more painful, red or hot to touch  -Bruising and swelling that does not begin to improve within the 48 hours or gets worse suddenly.

## 2022-10-27 NOTE — PROGRESS NOTES
PRE-PROCEDURE SCREENING    Pacemaker/ICD: No  Difficulty with numbing in the past: No  Local Anesthesia Reaction/passing out: No  Latex or adhesive allergy:  No  Bleeding/Clotting Disorders: No  Anticoagulant Therapy: Yes, Eliquis  Joint prosthesis: No  Artificial Heart Valve: No  Stroke or Seizures: No  Organ Transplant or Lymphoma: No  Immunosuppression: No  Respiratory Problems: No

## 2022-10-27 NOTE — PROGRESS NOTES
MOHS PROCEDURE NOTE    PHYSICIAN:  Colin Núñez. Krystal Banks MD, Who operated in two distinct and integrated capacities as the surgeon removing the tissue and as the pathologist examining the tissue. ASSISTANT: Katie Disla RN     REFERRING PROVIDER:  CY Adam    PREOPERATIVE DIAGNOSIS:  Superficial squamous cell carcinoma      SPECIFIC MOHS INDICATIONS:  size, clinically ill-defined borders, and in scar tissue    AUC SCORIN/9 Mohs surgery opted due to ill-defined nature occurring within scar tissue    POSTOPERATIVE DIAGNOSIS: SAME    LOCATION: Right dorsum distal forearm    OPERATIVE PROCEDURE:  MOHS MICROGRAPHIC SURGERY    RECONSTRUCTION OF DEFECT: Intermediate layered closure    PREOPERATIVE SIZE: 18x10 MM    DEFECT SIZE: 29x16 MM    LENGTH OF REPAIRED WOUND/SIZE OF FLAP/SIZE OF GRAFT:  60 MM    ANESTHESIA:  9mL 1% lidocaine with epinephrine 1:100,000 buffered. EBL:  MINIMAL    DURATION OF PROCEDURE:  35 MINUTES    POSTOPERATIVE OBSERVATION: 35 MINUTES    SPECIMENS:  SEE MOHS MAP    COMPLICATIONS:  NONE    DESCRIPTION OF PROCEDURE:  The patient was given a mirror, as appropriate, and the biopsy site was identified, marked with a surgical marking pen, and verified by the patient. Options for treatment were discussed and the patient was informed that Mohs surgery was the selected treatment based on its lower recurrence rate, given the features listed above, as compared to other treatment modalities such as excision, radiation, or curettage, and agreed with this treatment plan. Risks and benefits including bruising, swelling, bleeding, infection, nerve injury, recurrence, and scarring were discussed with the patient prior to the procedure and a written consent detailing these and other risks was reviewed with the patient and signed. There was a time out for person and procedure verification. The surgical site was prepped with an antiseptic solution.   Application of an antiseptic solution was repeated before each surgical stage. Stage I:  The clinically-apparent tumor was carefully defined and debulked, determining the edge of the surgical excision. A thin layer of tumor-laden tissue was excised with a narrow margin of normal-appearing skin, using the technique of Mohs. A map was prepared to correspond to the area of skin from which it was excised. Hemostasis was achieved using electrosurgery. The wound was bandaged. The tissue was prepared for the cryostat and sectioned. 1 section(s) prepared. Each section was coded, cut, and stained for microscopic examination. The entire base and margins of the excised piece of tissue were examined by the surgeon. The tissue was examined to the level of subcutaneous fat. No tumor was identified at the peripheral margins of stage I of microscopically controlled surgery. DEFECT MANAGEMENT:    REPAIR DESCRIPTION:  Various closure modalities were discussed with the patient, and it was decided that an intermediate layered repair would best preserve normal anatomic and functional relationships. Additional risk of wound dehiscence was discussed. The area was anesthetized with 1% lidocaine with epinephrine 1:100,000 buffered, was given a sterile prep using Chlorhexidine gluconate 4% solution and draped in the usual sterile fashion. Recreation and enlargement of the wound was performed by excising cones of tissue via the triangulation technique. The final incision lines were placed with respect for the patient's natural skin tension lines in a linear configuration to avoid functional and aesthetic distortion of adjacent free margins. Following minimal undermining, meticulous hemostasis was obtained with spot monopolar electrocoagulation. Subcutaneous dead space and dermis were closed using 3-0 Vicryl buried subcutaneous interrupted suture and the epidermis was approximated with   liquid skin adhesive.          WOUND COVERAGE:  The wound was cleaned with normal saline solution, dried off, Aquaphor ointment was applied, and the wound was covered. A dressing was applied for stabilization and light pressure. The patient was given detailed oral and written instructions on postoperative care. There were no complications. The patient left the Unit in good medical condition. FOLLOW-UP:  As liquid skin adhesive was placed for epidermal closure, the patient was asked to return if any questions or concerns arose, but otherwise will return to see general dermatology per their instructions.

## 2022-10-28 ENCOUNTER — TELEPHONE (OUTPATIENT)
Dept: SURGERY | Age: 72
End: 2022-10-28

## 2022-10-28 NOTE — TELEPHONE ENCOUNTER
The patient was in the office on 10/27/22 for Mohs located on the R dorsum distal forearm with ILC repair. The patient tolerated the procedure well and left the office in good condition. Pain level on post-operative day 1:  Pt States adequate    Any bleeding episode that required pressure to be held, bandage change or a call to the office or MD?  no     Any other issues?:  no    A post-operative telephone call was placed at 10/28/2022 at 10:14 AM   in order to check on the patient's recovery process. The patient reported doing well and had no complaints other than those listed above, if any. All of the patient's questions were answered.

## 2022-12-22 RX ORDER — METOPROLOL SUCCINATE 25 MG/1
TABLET, EXTENDED RELEASE ORAL
Qty: 90 TABLET | Refills: 3 | Status: SHIPPED | OUTPATIENT
Start: 2022-12-22

## 2023-01-30 NOTE — PROGRESS NOTES
CARDIOLOGY FOLLOW UP        Patient Name: Sandra Pardo  Primary Care physician: Carol Alvares MD    Reason for Referral/Chief Complaint: Sandra Pardo is a 71 y.o. patient who is here today for follow up for coronary artery disease. History of Present Illness:   Kirill Terrazas is a 71 y.o. patient with prior medical history notable for prior smoking history, ischemic CM with mild LV dysfunction, coronary artery disease with prior STEMI, paroxysmal atrial fibrillation, SVT and hyperlipidemia. 4/10/22 pt presented with chest pain and found to have anterolateral STEMI and taken to Cath Lab for study. LHC showed LAD/Diag lesion with thrombus each treated with HOLDEN. Echo showed EF 40-45%. Residual RCA disease. He developed AF post PCI. Started on eliquis and discharged with monitor for SVT noted during admission. Two week monitor 4-13-22 noted AF/AFL 0.67%; 18 episodes of SVT longest one was 4/18/22 for 14 beats. In the [facility] ([place]), Echo on 07/11/2022 EF 50-55%, mild HK of the anterolateral wall. AV mildly sclerotic, but opens well. LOV 7/26/22 maintaining weight loss. Eating plant based diet. Doing cardiac rehab, no trouble with this. No LOC. Angel [place]brook was stopped due to positional dizziness. Today, he reports feeling well. He states he is trying to remain active, weather permitting. He states this past fall he did a lot of hiking. 5-10 miles without limitations. He continues to ride his stationary bike 8 miles daily. More of a lighter pace. Denies shortness of breath or chest pressure/heaviness with this activity. He does note some transient shortness of breath at times, occurs without warning, just feels that he needs to take a deep breath. Notices positional dizziness persistent despite discontinuation of Entresto previously, but no syncope. Denies palpitations.  Denies paroxysmal nocturnal dyspnea, orthopnea, increasing lower extremity edema or weight gain.    Denies any evidence of hematemesis, hemoptysis, melena, hematochezia or hematuria with blood thinner. The patient is compliant with medications. Cost of medications is affordable. No endorsed side effects. Home Medications:  Were reviewed and are listed in nursing record and/or below  Prior to Admission medications    Medication Sig Start Date End Date Taking? Authorizing Provider   metoprolol succinate (TOPROL XL) 25 MG extended release tablet TAKE ONE TABLET BY MOUTH DAILY 12/22/22  Yes Hallie Mendez MD   atorvastatin (LIPITOR) 80 MG tablet Take 1 tablet by mouth nightly 7/26/22  Yes Hallie Mendez MD   ticagrelor (BRILINTA) 90 MG TABS tablet Take 1 tablet by mouth in the morning and 1 tablet before bedtime. 7/26/22  Yes Hallie Mendez MD   apixaban (ELIQUIS) 5 MG TABS tablet Take 1 tablet by mouth in the morning and 1 tablet before bedtime. 7/26/22  Yes Hallie Mendez MD   nitroGLYCERIN (NITROSTAT) 0.4 MG SL tablet Place 0.4 mg under the tongue every 5 minutes as needed 4/19/22  Yes Historical Provider, MD        CURRENT Medications:  No current facility-administered medications for this visit. Allergies:  Patient has no known allergies. Review of Systems:   A 14 point review of symptoms completed. Pertinent positives identified in the HPI, all other review of symptoms negative as below. Objective:     Vitals:    01/31/23 0751   BP: 116/68   Pulse: 74   SpO2: 96%   Weight: 197 lb (89.4 kg)   Height: 6' 1\" (1.854 m)        Weight: 197 lb (89.4 kg)       PHYSICAL EXAM:    General:  Pleasant elderly man in no acute distress   Head:  Normocephalic, atraumatic   Eyes:  Conjunctiva/corneas clear, anicteric sclerae    Nose: Nares normal, no drainage or sinus tenderness   Throat: No abnormalities of the lips, oral mucosa or tongue. Neck: Trachea midline.  Neck supple with no lymphadenopathy, thyroid not enlarged, symmetric, no tenderness/mass/nodules    Lungs:   Clear to auscultation bilaterally, no wheezes, no rales, no respiratory distress   Chest Wall:  No deformity or tenderness to palpation   Heart:  Regular rate and rhythm with occasional premature beats, normal S1, normal S2, no murmur, no rub, no S3/S4, PMI non-displaced. Abdomen:   Soft, non-tender, with normoactive bowel sounds. No masses, no hepatosplenomegaly   Extremities: No cyanosis, clubbing or pitting edema. Vascular: 2+ radial, 2+ posterior tibial pulses bilaterally. Brisk carotid upstrokes without carotid bruit. Skin: Skin color, texture, turgor are normal with no rashes or ulceration. Pysch: Euthymic mood, appropriate affect   Neurologic: Oriented to person, place and time. No slurred speech or facial asymmetry. No motor or sensory deficits on gross examination.          Labs:   CBC:   Lab Results   Component Value Date/Time    WBC 11.2 04/13/2022 11:35 AM    RBC 5.25 04/13/2022 11:35 AM    HGB 17.3 04/13/2022 11:35 AM    HCT 51.7 04/13/2022 11:35 AM    MCV 98.5 04/13/2022 11:35 AM    RDW 13.8 04/13/2022 11:35 AM     04/13/2022 11:35 AM     CMP:  Lab Results   Component Value Date/Time     05/11/2022 10:34 AM    K 4.4 05/11/2022 10:34 AM    K 3.9 04/13/2022 11:35 AM     05/11/2022 10:34 AM    CO2 24 05/11/2022 10:34 AM    BUN 16 05/11/2022 10:34 AM    CREATININE 1.1 05/11/2022 10:34 AM    GFRAA >60 05/11/2022 10:34 AM    AGRATIO 1.6 04/10/2022 12:00 PM    LABGLOM >60 05/11/2022 10:34 AM    GLUCOSE 111 05/11/2022 10:34 AM    PROT 7.7 04/10/2022 12:00 PM    CALCIUM 9.7 05/11/2022 10:34 AM    BILITOT 0.4 04/10/2022 12:00 PM    ALKPHOS 61 04/10/2022 12:00 PM    AST 68 04/10/2022 12:00 PM    ALT 27 04/10/2022 12:00 PM     PT/INR:  No results found for: PTINR  HgBA1c:  Lab Results   Component Value Date    LABA1C 5.6 04/11/2022     Lab Results   Component Value Date    TROPONINI 0.37 (H) 04/10/2022     Lab Results   Component Value Date    CHOL 114 05/25/2022    CHOL 228 (H) 04/10/2022     Lab Results Component Value Date    TRIG 79 2022    TRIG 113 04/10/2022     Lab Results   Component Value Date    HDL 32 (L) 2022    HDL 52 04/10/2022     Lab Results   Component Value Date    LDLCALC 66 2022    LDLCALC 153 (H) 04/10/2022     Lab Results   Component Value Date    LABVLDL 16 2022    LABVLDL 23 04/10/2022     No results found for: CHOLHDLRATIO      Cardiac Data:     Echo Limited: 2022  Summary   Limited exam for LVEF. Global LV function is lower normal with EF estimated from 50-55%. There is mild HK of the anterolateral wall. The aortic valve appears mildly sclerotic but opens well. [id number]  Sinus  Rhythm   -Left axis -anterior fascicular block. - Anterior -lateral infarct  (age undetermined). ABNORMAL     Echo: 2022   Summary  The left ventricular systolic function is severely reduced with an ejection fraction of 40-45 %. There is severe hypokinesis of the anterior and apical walls consistent with infarction within the territory of the left anterior descending  artery. Definity contrast administered with no evidence of left ventricular mass or thrombus noted. The aortic valve appears trileaflet but sclerotic. Mild mitral regurgitation. Cardiac catheterization 4/10/2022  Right coronary 75% stenosis proximal vessel  90% hazy, thrombotic stenosis proximal portion mid left anterior descending  Large first diagonal, 90% stenosis, large clot burden  Unable to place Penumbra for aspiration thrombectomy, abandoned, with subsequent PCI LAD, D1.   3.0 x 12 mm Resolute [place] drug-eluting stent to ostial diagonal.   3.5 mm x 22 mm Resolute Benjamín drug-eluting stent into the  LAD subsequently. It was an effective T-stent of the LAD and the diagonal branch. The LAD had RUDY-3 blood flow, but the diagonal branch had RUDY-1 flow refractory to multiple medications and could not pass balloon back into diagonal.   Technically difficult PCI procedure. CONCLUSIONS:  1. Successful T-stenting of diagonal LAD. There was a 3.5 x 22 mm  length Resolute Benjamín drug-eluting stent in the LAD and 3.0 x 12 mm  Resolute Benjamín drug-eluting stent in the diagonal branch. RUDY-3 blood  flow in the LAD and RUDY-2 flow in the diagonal branch. LV ejection  fraction estimated at 40% on left ventriculography with EDP of 14 mmHg. 2.  Right coronary artery has 75% to 80% proximal coronary stenosis,  dominant right coronary artery and minor irregularities elsewhere. LV  EF 40%, anterolateral wall hypokinesia, significant LVEDP 14.  3.  Angio-Seal successful right femoral arteriotomy. Additional studies:   Two week monitor 4-13-22 noted AF/AFL 0.67%; 18 episodes of SVT longest one was 4/18/22 for 14 beats. Impression and Plan:      CAD with anterolateral STEMI, status post PCI LAD/Diagonal, residual proximal RCA stenosis without angina and with recovery of LVEF by echo  Ischemic CM with mild LV dysfunction status post recovery, EF 50-55%. Paroxysmal atrial fibrillation, low burden  SVT    Hyperlipidemia  Prior tobacco use, now quit  Lightheadedness/positional dizziness    [id number] Score for Atrial Fibrillation Stroke Risk   Risk   Factors  Component Value   C CHF No 0   H HTN No 0   A2 Age >= 76 No,  (76 y.o.) 0   D DM No 0   S2 Prior Stroke/TIA No 0   V Vascular Disease Yes 1   A Age 74-69 Yes,  (76 y.o.) 1   Sc Sex male 0    [id number]  Score  2   Score last updated 5/23/22 1:63 PM EDT    Click here for a link to the UpToDate guideline \"Atrial Fibrillation: Anticoagulation therapy to prevent embolization    Disclaimer: Risk Score calculation is dependent on accuracy of patient problem list and past encounter diagnosis.       Patient Active Problem List   Diagnosis    Acute ST elevation myocardial infarction (STEMI) due to occlusion of left anterior descending (LAD) coronary artery (HCC)    STEMI (ST elevation myocardial infarction) ([facility] [address]    Acute anterior myocardial infarction [facility])    Acute systolic heart failure (HCC)    Ischemic cardiomyopathy    Mixed hyperlipidemia    Atrial fibrillation (HCC)    Lightheadedness    Squamous cell carcinoma of arm, right       PLAN:  We encouraged modest weight loss through implementing appropriate dietary measures as well as initiation of a graded exercise program with the ultimate goal of 150 minutes of aerobic exercise weekly. No change in medications at this time. Continue current regimen. Can discuss weaning from [address] to baby aspirin daily next visit. No cardiac testing warranted at this time  Labs in 6 months prior to OV - Fasting Lipids    Follow up in 6 months     Scribe's attestation: This note was scribed in the presence of Dr. Adam Benjamin MD by Lesly Sanchez, RN    The scribes documentation has been prepared under my direction and personally reviewed by me in its entirety. I confirm that the note above accurately reflects all work, treatment, procedures, and medical decision making performed by me. Mele Motley MD, personally performed the services described in this documentation as scribed by Lesly Sanchez RN in my presence, and it is both accurate and complete to the best of our ability. I will address the patient's cardiac risk factors and adjusted pharmacologic treatment as needed. In addition, I have reinforced the need for patient directed risk factor modification. All questions and concerns were addressed to the patient/family. Alternatives to my treatment were discussed. Thank you for allowing us to participate in the care of Wai Lim. Please call me with any questions [phone].     Smith Warner MD, [address] [facility]  Cardiovascular Disease  [facility]  [phone] [facility]  [phone] [address] [place] [address]  1/31/2023 8:11 AM

## 2023-01-31 ENCOUNTER — OFFICE VISIT (OUTPATIENT)
Dept: CARDIOLOGY CLINIC | Age: 73
End: 2023-01-31
Payer: MEDICARE

## 2023-01-31 VITALS
DIASTOLIC BLOOD PRESSURE: 68 MMHG | SYSTOLIC BLOOD PRESSURE: 116 MMHG | BODY MASS INDEX: 26.11 KG/M2 | HEIGHT: 73 IN | HEART RATE: 74 BPM | OXYGEN SATURATION: 96 % | WEIGHT: 197 LBS

## 2023-01-31 DIAGNOSIS — I48.0 PAROXYSMAL ATRIAL FIBRILLATION (HCC): ICD-10-CM

## 2023-01-31 DIAGNOSIS — I21.02 ST ELEVATION MYOCARDIAL INFARCTION INVOLVING LEFT ANTERIOR DESCENDING (LAD) CORONARY ARTERY (HCC): Primary | ICD-10-CM

## 2023-01-31 DIAGNOSIS — E78.2 MIXED HYPERLIPIDEMIA: ICD-10-CM

## 2023-01-31 PROCEDURE — 99214 OFFICE O/P EST MOD 30 MIN: CPT | Performed by: INTERNAL MEDICINE

## 2023-01-31 PROCEDURE — 1123F ACP DISCUSS/DSCN MKR DOCD: CPT | Performed by: INTERNAL MEDICINE

## 2023-01-31 NOTE — PATIENT INSTRUCTIONS
PLAN:  Continue to remain as active as you can  No change in medications at this time. Continue current regimen. Will discuss coming off Mila Gun at your next visit.    No cardiac testing warranted at this time  Labs in 6 months - Fasting Lipids    Follow up in 6 months

## 2023-02-02 ENCOUNTER — TELEPHONE (OUTPATIENT)
Dept: CARDIOLOGY CLINIC | Age: 73
End: 2023-02-02

## 2023-02-02 NOTE — TELEPHONE ENCOUNTER
Pascual De Guzman from pts pharmacy called to verify if pt should be taking Brilinta and Eliquis. Per pts last ov note on 01/31/2023 with rjm    PLAN:  Continue to remain as active as you can  No change in medications at this time. Continue current regimen. Will discuss coming off Isabella Barry at your next visit. Please advise.

## 2023-02-02 NOTE — TELEPHONE ENCOUNTER
Continue both for now yes. We will re-assess next visit for determination on stopping Brilinta versus continued extended course.  TY

## 2023-02-03 NOTE — TELEPHONE ENCOUNTER
Pt called into office and wants to know if we have any coupon or copay cards for eliquis and or entresto. Pt states he will not qualify for pt assistance so is not interested in that at this time but is interested in the copay cards if available. Please advise.

## 2023-02-03 NOTE — TELEPHONE ENCOUNTER
Called and spoke with pt, he stated he has state insurance so he does not qualify for copay card.  Pt will call if he changes his mind regarding pt assistance

## 2023-07-05 ENCOUNTER — HOSPITAL ENCOUNTER (OUTPATIENT)
Age: 73
Discharge: HOME | End: 2023-07-05
Payer: MEDICARE

## 2023-07-05 DIAGNOSIS — I21.02 ST ELEVATION MYOCARDIAL INFARCTION INVOLVING LEFT ANTERIOR DESCENDING (LAD) CORONARY ARTERY (HCC): ICD-10-CM

## 2023-07-05 DIAGNOSIS — E78.2 MIXED HYPERLIPIDEMIA: ICD-10-CM

## 2023-07-05 LAB
CHOLEST SERPL-MCNC: 128 MG/DL (ref 0–199)
HDLC SERPL-MCNC: 57 MG/DL (ref 40–60)
LDLC SERPL CALC-MCNC: 60 MG/DL
TRIGL SERPL-MCNC: 53 MG/DL (ref 0–150)
VLDLC SERPL CALC-MCNC: 11 MG/DL

## 2023-07-05 PROCEDURE — 80061 LIPID PANEL: CPT

## 2023-07-05 PROCEDURE — 36415 COLL VENOUS BLD VENIPUNCTURE: CPT

## 2023-07-07 ENCOUNTER — TELEPHONE (OUTPATIENT)
Dept: CARDIOLOGY CLINIC | Age: 73
End: 2023-07-07

## 2023-07-07 NOTE — TELEPHONE ENCOUNTER
----- Message from Dave Strickland MD sent at 7/6/2023 10:41 AM EDT -----  Please notify patient that their chol looks good  Cont current tx plan

## 2023-07-14 ENCOUNTER — OFFICE VISIT (OUTPATIENT)
Dept: CARDIOLOGY CLINIC | Age: 73
End: 2023-07-14
Payer: MEDICARE

## 2023-07-14 VITALS
OXYGEN SATURATION: 95 % | BODY MASS INDEX: 24.39 KG/M2 | WEIGHT: 184 LBS | HEIGHT: 73 IN | DIASTOLIC BLOOD PRESSURE: 78 MMHG | SYSTOLIC BLOOD PRESSURE: 130 MMHG | HEART RATE: 68 BPM

## 2023-07-14 DIAGNOSIS — I48.0 PAROXYSMAL ATRIAL FIBRILLATION (HCC): ICD-10-CM

## 2023-07-14 DIAGNOSIS — I10 ESSENTIAL HYPERTENSION: ICD-10-CM

## 2023-07-14 DIAGNOSIS — I25.10 CORONARY ARTERY DISEASE INVOLVING NATIVE CORONARY ARTERY OF NATIVE HEART WITHOUT ANGINA PECTORIS: Primary | ICD-10-CM

## 2023-07-14 PROCEDURE — 99214 OFFICE O/P EST MOD 30 MIN: CPT | Performed by: INTERNAL MEDICINE

## 2023-07-14 PROCEDURE — 1123F ACP DISCUSS/DSCN MKR DOCD: CPT | Performed by: INTERNAL MEDICINE

## 2023-07-14 PROCEDURE — 3075F SYST BP GE 130 - 139MM HG: CPT | Performed by: INTERNAL MEDICINE

## 2023-07-14 PROCEDURE — 3078F DIAST BP <80 MM HG: CPT | Performed by: INTERNAL MEDICINE

## 2023-07-14 RX ORDER — LISINOPRIL 2.5 MG/1
2.5 TABLET ORAL DAILY
Qty: 90 TABLET | Refills: 3 | Status: SHIPPED | OUTPATIENT
Start: 2023-07-14

## 2023-07-14 RX ORDER — METOPROLOL SUCCINATE 25 MG/1
25 TABLET, EXTENDED RELEASE ORAL DAILY
Qty: 90 TABLET | Refills: 3 | Status: SHIPPED | OUTPATIENT
Start: 2023-07-14

## 2023-07-14 RX ORDER — ATORVASTATIN CALCIUM 80 MG/1
80 TABLET, FILM COATED ORAL NIGHTLY
Qty: 90 TABLET | Refills: 3 | Status: SHIPPED | OUTPATIENT
Start: 2023-07-14

## 2023-07-14 RX ORDER — ASPIRIN 81 MG/1
81 TABLET, CHEWABLE ORAL DAILY
Qty: 90 TABLET | Refills: 3 | Status: SHIPPED | OUTPATIENT
Start: 2023-07-14 | End: 2024-07-13

## 2023-07-14 NOTE — PATIENT INSTRUCTIONS
PLAN:  Continue to remain active and exercising    May stop taking the Brilinta since your are now over a year out from your stent  Remain on Aspirin  81 mg daily, Eliquis, atorvastatin, metoprolol   Recommend starting Lisinopril 2. 5 mg daily for better blood pressure control   No cardiac testing warranted at this time  Continue to monitor your blood pressure.   Blood pressure goal is 130/80 or less

## 2023-08-04 RX ORDER — TICAGRELOR 90 MG/1
TABLET ORAL
Qty: 180 TABLET | Refills: 3 | OUTPATIENT
Start: 2023-08-04

## 2024-01-17 ENCOUNTER — TELEPHONE (OUTPATIENT)
Dept: CARDIOLOGY CLINIC | Age: 74
End: 2024-01-17

## 2024-01-17 NOTE — TELEPHONE ENCOUNTER
None routine. Will evaluate at that time and consider if any need but none right now indicated.  KRISTEN PISANO

## 2024-01-17 NOTE — TELEPHONE ENCOUNTER
Pts wife called and stated pt has an upcoming ov 02/12/24 rjm. Pts wife wants to know if rjm wants pt to get any bloodwork completed before ov? Please advise. Pt ph# [phone]

## 2024-01-29 NOTE — TELEPHONE ENCOUNTER
Created telephone encounter. Per Pt HIPAA from can leave results on machine. LMOM relaying message per physician. Pt to call the office with any concerns. Spoke with the patient. He has asked that we mail the form to the . I have gone a head and done this and kept a copy.

## 2024-02-08 NOTE — PROGRESS NOTES
CARDIOLOGY FOLLOW UP        Patient Name: Jacinto Osman Jr  Primary Care physician: Kade Simental III, MD    Reason for Referral/Chief Complaint: Jacinto Osman Jr is a 73 y.o. patient who is here today for follow up for coronary artery disease.     History of Present Illness:   Jacinto Osman is a 73 y.o. patient with prior medical history notable for prior smoking history, ischemic CM with mild LV dysfunction, coronary artery disease with prior STEMI, paroxysmal atrial fibrillation, SVT and hyperlipidemia.    4/10/22 pt presented with chest pain and found to have anterolateral STEMI and taken to Cath Lab for study. LHC showed LAD/Diag lesion with thrombus each treated with HOLDEN. Echo showed EF 40-45%. Residual RCA disease. He developed AF post PCI. Started on eliquis and discharged with monitor for SVT noted during admission.  Two week monitor 4-13-22 noted AF/AFL 0.67%; 18 episodes of SVT longest one was 4/18/22 for 14 beats.      In the interm, Echo on 07/11/2022 EF 50-55%, mild HK of the anterolateral wall. AV mildly sclerotic, but opens well.     OV 7/26/22 Entresto was stopped due to positional dizziness.     LOV 7/14/23 noted doing well with his workouts, riding stationary bike for 30+ minutes at a time with no angina or limitations otherwise.    Today he presents with his wife.  States feeling well.  He brings in a log of his blood pressures.  These show 130s-140s systolic over 70s-80s diastolic.  He reports an \"ache\" center of chest.  This can occur randomly, with or without exertion.  Lasts 15 minutes or less.  States can feel it when he moves/twists his upper or coughs.  He is not sure if it is muscle, bones or his heart so he wanted to mention this today.  He has not taken an SL nitro when the \"ache\" occurs. He continues riding his stationary bike.  He does not experience chest discomfort with exercise nor his usual daily activities.  Denies shortness of breath, palpitations,

## 2024-02-12 ENCOUNTER — OFFICE VISIT (OUTPATIENT)
Dept: CARDIOLOGY CLINIC | Age: 74
End: 2024-02-12
Payer: MEDICARE

## 2024-02-12 VITALS
WEIGHT: 177 LBS | HEIGHT: 73 IN | HEART RATE: 62 BPM | BODY MASS INDEX: 23.46 KG/M2 | OXYGEN SATURATION: 98 % | DIASTOLIC BLOOD PRESSURE: 64 MMHG | SYSTOLIC BLOOD PRESSURE: 124 MMHG

## 2024-02-12 DIAGNOSIS — I25.5 ISCHEMIC CARDIOMYOPATHY: ICD-10-CM

## 2024-02-12 DIAGNOSIS — I25.10 CORONARY ARTERY DISEASE INVOLVING NATIVE CORONARY ARTERY OF NATIVE HEART WITHOUT ANGINA PECTORIS: Primary | ICD-10-CM

## 2024-02-12 DIAGNOSIS — I48.0 PAROXYSMAL ATRIAL FIBRILLATION (HCC): ICD-10-CM

## 2024-02-12 DIAGNOSIS — R07.89 OTHER CHEST PAIN: ICD-10-CM

## 2024-02-12 PROCEDURE — 1123F ACP DISCUSS/DSCN MKR DOCD: CPT | Performed by: INTERNAL MEDICINE

## 2024-02-12 PROCEDURE — 99214 OFFICE O/P EST MOD 30 MIN: CPT | Performed by: INTERNAL MEDICINE

## 2024-02-12 PROCEDURE — 93000 ELECTROCARDIOGRAM COMPLETE: CPT | Performed by: INTERNAL MEDICINE

## 2024-02-12 NOTE — PATIENT INSTRUCTIONS
PLAN:  Continue to remain active with exercise regimen - doing well    Continue taking Aspirin 81 mg daily in addition to Eliquis  Continue atorvastatin, metoprolol as well as lisinopril for better blood pressure control and myopathy.    (previously dizzy with Entresto - monitor tolerance).   Continue to monitor blood pressure.  Blood pressure goal is 130/80 or less   No cardiac testing warranted at this time  EKG in office today

## 2024-07-05 ENCOUNTER — TELEPHONE (OUTPATIENT)
Dept: CARDIOLOGY CLINIC | Age: 74
End: 2024-07-05

## 2024-07-05 RX ORDER — METOPROLOL SUCCINATE 25 MG/1
25 TABLET, EXTENDED RELEASE ORAL DAILY
Qty: 90 TABLET | Refills: 3 | Status: SHIPPED | OUTPATIENT
Start: 2024-07-05

## 2024-07-05 RX ORDER — APIXABAN 5 MG/1
5 TABLET, FILM COATED ORAL 2 TIMES DAILY
Qty: 180 TABLET | Refills: 3 | Status: SHIPPED | OUTPATIENT
Start: 2024-07-05

## 2024-07-05 RX ORDER — ATORVASTATIN CALCIUM 80 MG/1
80 TABLET, FILM COATED ORAL NIGHTLY
Qty: 90 TABLET | Refills: 3 | Status: SHIPPED | OUTPATIENT
Start: 2024-07-05

## 2024-07-05 RX ORDER — LISINOPRIL 2.5 MG/1
2.5 TABLET ORAL DAILY
Qty: 90 TABLET | Refills: 3 | Status: SHIPPED | OUTPATIENT
Start: 2024-07-05

## 2024-07-05 NOTE — TELEPHONE ENCOUNTER
Pts wife called requesting refills on the following medications :   lisinopril (PRINIVIL;ZESTRIL) 2.5 MG tablet [[id number]]    apixaban (ELIQUIS) 5 MG TABS tablet [[id number]]    atorvastatin (LIPITOR) 80 MG tablet [[id number]]    metoprolol succinate (TOPROL XL) 25 MG extended release tablet [[id number]]    Please send to :   [facility] [id number] - [place], [place] - [address] [facility] -  [phone] - F [phone]  [address] [facility] [address]  Phone: [phone]  Fax: [phone]     LOV 02/12/24 [facility]   NOV 09/27/24 [facility]

## 2024-09-27 ENCOUNTER — OFFICE VISIT (OUTPATIENT)
Dept: CARDIOLOGY CLINIC | Age: 74
End: 2024-09-27
Payer: MEDICARE

## 2024-09-27 VITALS
WEIGHT: 176 LBS | OXYGEN SATURATION: 96 % | SYSTOLIC BLOOD PRESSURE: 130 MMHG | HEIGHT: 73 IN | DIASTOLIC BLOOD PRESSURE: 62 MMHG | BODY MASS INDEX: 23.33 KG/M2 | HEART RATE: 55 BPM

## 2024-09-27 DIAGNOSIS — I25.10 CORONARY ARTERY DISEASE INVOLVING NATIVE CORONARY ARTERY OF NATIVE HEART WITHOUT ANGINA PECTORIS: Primary | ICD-10-CM

## 2024-09-27 DIAGNOSIS — I25.2 HISTORY OF ST ELEVATION MYOCARDIAL INFARCTION (STEMI): ICD-10-CM

## 2024-09-27 DIAGNOSIS — I10 ESSENTIAL HYPERTENSION: ICD-10-CM

## 2024-09-27 PROCEDURE — 3075F SYST BP GE 130 - 139MM HG: CPT | Performed by: INTERNAL MEDICINE

## 2024-09-27 PROCEDURE — 99214 OFFICE O/P EST MOD 30 MIN: CPT | Performed by: INTERNAL MEDICINE

## 2024-09-27 PROCEDURE — 1123F ACP DISCUSS/DSCN MKR DOCD: CPT | Performed by: INTERNAL MEDICINE

## 2024-09-27 PROCEDURE — 3078F DIAST BP <80 MM HG: CPT | Performed by: INTERNAL MEDICINE

## 2025-06-19 DIAGNOSIS — Z79.899 MEDICATION MANAGEMENT: Primary | ICD-10-CM

## 2025-06-19 DIAGNOSIS — E78.2 MIXED HYPERLIPIDEMIA: ICD-10-CM

## 2025-06-19 RX ORDER — ATORVASTATIN CALCIUM 80 MG/1
80 TABLET, FILM COATED ORAL NIGHTLY
Qty: 90 TABLET | Refills: 0 | Status: SHIPPED | OUTPATIENT
Start: 2025-06-19

## 2025-06-19 RX ORDER — METOPROLOL SUCCINATE 25 MG/1
25 TABLET, EXTENDED RELEASE ORAL DAILY
Qty: 90 TABLET | Refills: 0 | Status: SHIPPED | OUTPATIENT
Start: 2025-06-19

## 2025-06-19 RX ORDER — LISINOPRIL 2.5 MG/1
2.5 TABLET ORAL DAILY
Qty: 90 TABLET | Refills: 0 | Status: SHIPPED | OUTPATIENT
Start: 2025-06-19

## 2025-06-19 NOTE — TELEPHONE ENCOUNTER
Pts spouse, Sharon called the office stating that Epi is needing all his medications refilled. Caryl stated that they get aspirin over the counter    Lipitor 80mg QD  Metoptolol 25mg QD  Eliquis 5mg BID  Lisinopril 2.5mg QD    Preferred pharmacy is    [facility] [id number] [address] -  [phone] -  [phone]     LOV 9.27.24 stephen  NOV 9.09.25 [facility]

## 2025-06-19 NOTE — TELEPHONE ENCOUNTER
Last Office Visit: 09/27/2024 Provider: ALIYA  **Is provider OOT? No    Next Office Visit: 9/9/2025 Provider: ALIYA    Lab orders needed? yes -    Encounter provider correct? Yes If not, change provider  Script changes since last refill? no    LAST LABS:   Pt has not had labs since 2022/2023, I have ordered cbc, cmp and FLP.  I attempted to reach pt to let him know these completed. Lvm to call the office back

## 2025-06-24 ENCOUNTER — RESULTS FOLLOW-UP (OUTPATIENT)
Dept: CARDIOLOGY CLINIC | Age: 75
End: 2025-06-24

## 2025-06-24 ENCOUNTER — HOSPITAL ENCOUNTER (OUTPATIENT)
Dept: LAB | Age: 75
Discharge: HOME | End: 2025-06-24
Payer: MEDICARE

## 2025-06-24 DIAGNOSIS — Z79.899 MEDICATION MANAGEMENT: ICD-10-CM

## 2025-06-24 DIAGNOSIS — E78.2 MIXED HYPERLIPIDEMIA: ICD-10-CM

## 2025-06-24 LAB
ALBUMIN SERPL-MCNC: 4.2 G/DL (ref 3.4–5)
ALBUMIN/GLOB SERPL: 1.6 {RATIO} (ref 1.1–2.2)
ALP SERPL-CCNC: 50 U/L (ref 40–129)
ALT SERPL-CCNC: 44 U/L (ref 10–40)
ANION GAP SERPL CALCULATED.3IONS-SCNC: 11 MMOL/L (ref 3–16)
AST SERPL-CCNC: 41 U/L (ref 15–37)
BILIRUB SERPL-MCNC: 0.8 MG/DL (ref 0–1)
BUN SERPL-MCNC: 11 MG/DL (ref 7–20)
CALCIUM SERPL-MCNC: 9.3 MG/DL (ref 8.3–10.6)
CHLORIDE SERPL-SCNC: 104 MMOL/L (ref 99–110)
CHOLEST SERPL-MCNC: 126 MG/DL (ref 0–199)
CO2 SERPL-SCNC: 27 MMOL/L (ref 21–32)
CREAT SERPL-MCNC: 0.8 MG/DL (ref 0.8–1.3)
DEPRECATED RDW RBC AUTO: 13.9 % (ref 12.4–15.4)
GFR SERPLBLD CREATININE-BSD FMLA CKD-EPI: >90 ML/MIN/{1.73_M2}
GLUCOSE SERPL-MCNC: 112 MG/DL (ref 70–99)
HCT VFR BLD AUTO: 41.8 % (ref 40.5–52.5)
HDLC SERPL-MCNC: 65 MG/DL (ref 40–60)
HGB BLD-MCNC: 14.6 G/DL (ref 13.5–17.5)
LDL CHOLESTEROL: 48 MG/DL
MCH RBC QN AUTO: 34 PG (ref 26–34)
MCHC RBC AUTO-ENTMCNC: 35 G/DL (ref 31–36)
MCV RBC AUTO: 97 FL (ref 80–100)
PLATELET # BLD AUTO: 258 K/UL (ref 135–450)
PMV BLD AUTO: 7.9 FL (ref 5–10.5)
POTASSIUM SERPL-SCNC: 4.2 MMOL/L (ref 3.5–5.1)
PROT SERPL-MCNC: 6.8 G/DL (ref 6.4–8.2)
RBC # BLD AUTO: 4.31 M/UL (ref 4.2–5.9)
SODIUM SERPL-SCNC: 142 MMOL/L (ref 136–145)
TRIGL SERPL-MCNC: 63 MG/DL (ref 0–150)
VLDLC SERPL CALC-MCNC: 13 MG/DL
WBC # BLD AUTO: 7.2 K/UL (ref 4–11)

## 2025-06-24 PROCEDURE — 36415 COLL VENOUS BLD VENIPUNCTURE: CPT

## 2025-06-24 PROCEDURE — 85027 COMPLETE CBC AUTOMATED: CPT

## 2025-06-24 PROCEDURE — 80061 LIPID PANEL: CPT

## 2025-06-24 PROCEDURE — 80053 COMPREHEN METABOLIC PANEL: CPT

## 2025-07-03 RX ORDER — LISINOPRIL 2.5 MG/1
2.5 TABLET ORAL DAILY
Qty: 90 TABLET | Refills: 0 | Status: SHIPPED | OUTPATIENT
Start: 2025-07-03

## 2025-07-03 RX ORDER — APIXABAN 5 MG/1
5 TABLET, FILM COATED ORAL 2 TIMES DAILY
Qty: 180 TABLET | Refills: 0 | Status: SHIPPED | OUTPATIENT
Start: 2025-07-03

## 2025-07-03 RX ORDER — METOPROLOL SUCCINATE 25 MG/1
25 TABLET, EXTENDED RELEASE ORAL DAILY
Qty: 90 TABLET | Refills: 0 | Status: SHIPPED | OUTPATIENT
Start: 2025-07-03

## 2025-07-03 RX ORDER — ATORVASTATIN CALCIUM 80 MG/1
80 TABLET, FILM COATED ORAL NIGHTLY
Qty: 90 TABLET | Refills: 0 | Status: SHIPPED | OUTPATIENT
Start: 2025-07-03

## 2025-07-03 NOTE — TELEPHONE ENCOUNTER
Last Office Visit: 9/27/2024 Provider: ALIYA  **Is provider OOT? No    Next Office Visit: 9/9/2025 Provider: ALIYA    LAST LABS:   CBC:  Lab Results   Component Value Date    WBC 7.2 06/24/2025    HGB 14.6 06/24/2025    HCT 41.8 06/24/2025    MCV 97.0 06/24/2025     06/24/2025    LYMPHOPCT 11.0 04/10/2022    RBC 4.31 06/24/2025    MCH 34.0 06/24/2025    MCHC 35.0 06/24/2025    RDW 13.9 06/24/2025           CMP:  Lab Results   Component Value Date     06/24/2025    K 4.2 06/24/2025     06/24/2025    CO2 27 06/24/2025    BUN 11 06/24/2025    CREATININE 0.8 06/24/2025    GLUCOSE 112 (H) 06/24/2025    CALCIUM 9.3 06/24/2025    BILITOT 0.8 06/24/2025    ALKPHOS 50 06/24/2025    AST 41 (H) 06/24/2025    ALT 44 (H) 06/24/2025    LABGLOM >90 06/24/2025    GFRAA >60 05/11/2022    AGRATIO 1.6 06/24/2025         Lipid:  Lab Results   Component Value Date    CHOL 128 07/05/2023    TRIG 53 07/05/2023    HDL 65 (H) 06/24/2025    LDL 48 06/24/2025    VLDL 13 06/24/2025     Lab orders needed? no